# Patient Record
Sex: MALE | Race: WHITE | NOT HISPANIC OR LATINO | Employment: OTHER | URBAN - METROPOLITAN AREA
[De-identification: names, ages, dates, MRNs, and addresses within clinical notes are randomized per-mention and may not be internally consistent; named-entity substitution may affect disease eponyms.]

---

## 2023-03-27 ENCOUNTER — NURSING HOME VISIT (OUTPATIENT)
Dept: POST ACUTE CARE | Facility: EXTERNAL LOCATION | Age: 73
End: 2023-03-27
Payer: COMMERCIAL

## 2023-03-27 DIAGNOSIS — F52.8 HYPERSEXUALITY: ICD-10-CM

## 2023-03-27 DIAGNOSIS — F20.9 SCHIZOPHRENIA, UNSPECIFIED TYPE (MULTI): ICD-10-CM

## 2023-03-27 DIAGNOSIS — G20.A1 SEVERE DEMENTIA DUE TO PARKINSON'S DISEASE, WITH MOOD DISTURBANCE (MULTI): ICD-10-CM

## 2023-03-27 DIAGNOSIS — G20.A1 PARKINSON DISEASE (MULTI): ICD-10-CM

## 2023-03-27 DIAGNOSIS — I48.91 ATRIAL FIBRILLATION, UNSPECIFIED TYPE (MULTI): ICD-10-CM

## 2023-03-27 DIAGNOSIS — I15.9 SECONDARY HYPERTENSION: ICD-10-CM

## 2023-03-27 DIAGNOSIS — F02.C3 SEVERE DEMENTIA DUE TO PARKINSON'S DISEASE, WITH MOOD DISTURBANCE (MULTI): ICD-10-CM

## 2023-03-27 DIAGNOSIS — F41.8 DEPRESSION WITH ANXIETY: ICD-10-CM

## 2023-03-27 DIAGNOSIS — G47.33 OSA ON CPAP: ICD-10-CM

## 2023-03-27 PROCEDURE — 99309 SBSQ NF CARE MODERATE MDM 30: CPT | Performed by: INTERNAL MEDICINE

## 2023-03-27 NOTE — LETTER
Patient: Jian Odell  : 1950    Encounter Date: 2023    Name: Jian Odell  : 1950  MRN: [id number]  Visit Date: 3/27/2023  Chief Complaint: Monthly visit for management of chronic disease    HPI: 73 y/o W[place] male with PMH remarkable for Parkinson's disease, Dementia with behavioral disturbances, Schizophrenia, A-Fib, Sexual Dysfunction, HTN, HLD, RLS, PVD, THEA, asthma, Polyneuropathy, Depression, Anxiety and SSS whom was a LTC patient at [facility] where he was having increased sexual behaviors. Pt was brought to  d/t needing to be on an all male unit. Pt was brought to  for med mgt, psych services.    Subjective: Seen and examined today. Sitting up in wheelchair on exam. Reports no issues. Following me around the unit. Nursing reports no new concerns. I have reviewed nursing notes since my last visit and document any significant changes Reviewed orders, medications, Labs. Reviewed and updated Interval hx and meds reviewed. Reviewed chart looking at current medications, treatments, labs, x-rays etc.     ROS:  As above in subjective. Otherwise, all other systems have been reviewed and are negative for complaint.    Medications:  Medications reviewed and verified in NH chart.     Allergies:  NKA    Vital Signs: Reviewed in [facility]    Physical Exam:  General: Alert and oriented x1-2, well nourished, no acute distress.  Eye: EOMI, normal conjunctiva.  HENT: Normocephalic, clear tympanic membranes  Neck: Supple, non-tender, no carotid bruits  Lungs: Clear to auscultation, non-labored respiration.  Heart: Normal rate, regular rhythm  Abdomen: Soft, non-tender, non-distended  Musculoskeletal: Normal range of motion - some weakness  Skin: Skin is warm, dry and pink, no rashes or lesions.  Neurologic: intact senses  Psychiatric: Cooperative    Results/Data:   Lab Results   Component Value Date    WBC 7.3 2022    HGB 13.3 (L) 2022    HCT 40.9 (L) 2022      08/03/2022    CHOL 108 05/04/2022    TRIG 96 04/06/2022    HDL 42.9 05/04/2022    ALT <3 (L) 05/11/2021    AST 11 05/11/2021     08/03/2022    K 3.7 08/03/2022     08/03/2022    CREATININE 0.81 08/03/2022    BUN 21 08/03/2022    CO2 29 08/03/2022    HGBA1C 5.9 (A) 03/04/2022       Provider Impression:   Sexual Dysfunction, Dementia with Behavioral Disturbance, Anxiety, Major Depression, Schizophrenia  - continue monitoring in secure unit  - consult psych services  - continue with current psych medications, any adjustment will come from psych    CV- HTN, AFib, h/o SSS, HLD, PVD  - continue with metoprolol for rate and BP control  - continue with lasix 20mg and kcL  - encourage elevation of BLE  - wrap with ACE wraps while OOB, remove qHS and PRN    Parkinson's Disease  - continue with current medications  - tremor on exam but pt reports that is not new  - FU with Neuro as advised q6m    Deconditioning, IFM, Weakness, Dysphagia  - PT OT to eval and tx    THEA  - continue with HS Cpap  - c/w prn inhalers    Gastric Protection  - continue with pepcid     Code Status  - Full Code    ----------------  Written by Evita Saenz LPN, acting as a scribe for Dr. Johnson. This note accurately reflects the work and decisions made by Dr. Johnson.     I, Dr. Johnson, attest all medical record entries made by the scribe were under my direction and were personally dictated by me. I have reviewed the chart and agree that the record accurately reflects my performance of the history, physical exam, and assessment and plan.       Electronically Signed By: Johanny Pierre MD   5/16/23  1:18 PM

## 2023-05-04 ENCOUNTER — NURSING HOME VISIT (OUTPATIENT)
Dept: POST ACUTE CARE | Facility: EXTERNAL LOCATION | Age: 73
End: 2023-05-04
Payer: COMMERCIAL

## 2023-05-04 DIAGNOSIS — F20.9 SCHIZOPHRENIA, UNSPECIFIED TYPE (MULTI): ICD-10-CM

## 2023-05-04 DIAGNOSIS — I48.91 ATRIAL FIBRILLATION, UNSPECIFIED TYPE (MULTI): ICD-10-CM

## 2023-05-04 DIAGNOSIS — F02.C3 SEVERE DEMENTIA DUE TO PARKINSON'S DISEASE, WITH MOOD DISTURBANCE (MULTI): ICD-10-CM

## 2023-05-04 DIAGNOSIS — F41.8 DEPRESSION WITH ANXIETY: ICD-10-CM

## 2023-05-04 DIAGNOSIS — F52.8 HYPERSEXUALITY: ICD-10-CM

## 2023-05-04 DIAGNOSIS — I15.9 SECONDARY HYPERTENSION: ICD-10-CM

## 2023-05-04 DIAGNOSIS — G20.A1 PARKINSON DISEASE (MULTI): ICD-10-CM

## 2023-05-04 DIAGNOSIS — G47.33 OSA ON CPAP: ICD-10-CM

## 2023-05-04 DIAGNOSIS — G20.A1 SEVERE DEMENTIA DUE TO PARKINSON'S DISEASE, WITH MOOD DISTURBANCE (MULTI): ICD-10-CM

## 2023-05-04 PROCEDURE — 99308 SBSQ NF CARE LOW MDM 20: CPT | Performed by: INTERNAL MEDICINE

## 2023-05-04 NOTE — LETTER
Patient: Jian Odell  : 1950    Encounter Date: 2023    Name: Jian Odell  : 1950  MRN: [id number]  Visit Date: 2023  Chief Complaint: Monthly visit for management of chronic disease    HPI: 71 y/o [place] male with PMH remarkable for Parkinson's disease, Dementia with behavioral disturbances, Schizophrenia, A-Fib, Sexual Dysfunction, HTN, HLD, RLS, PVD, THEA, asthma, Polyneuropathy, Depression, Anxiety and SSS whom was a LTC patient at [facility] where he was having increased sexual behaviors. Pt was brought to  d/t needing to be on an all male unit. Pt was brought to  for med mgt, psych services.    Subjective: Seen and examined today. Sitting up in wheelchair on exam. Reports no issues other than having some loose stools after having PO olive oil, tablespoon worth. Nursing reports no new concerns. I have reviewed nursing notes since my last visit and document any significant changes Reviewed orders, medications, Labs. Reviewed and updated Interval hx and meds reviewed. Reviewed chart looking at current medications, treatments, labs, x-rays etc.     ROS:  As above in subjective. Otherwise, all other systems have been reviewed and are negative for complaint.    Medications:  Medications reviewed and verified in NH chart.     Allergies:  NKA    Vital Signs: Reviewed in [facility]    Physical Exam:  General: Alert and oriented x1-2, well nourished, no acute distress.  Eye: EOMI, normal conjunctiva.  HENT: Normocephalic, clear tympanic membranes  Neck: Supple, non-tender, no carotid bruits  Lungs: Clear to auscultation, non-labored respiration.  Heart: Normal rate, regular rhythm  Abdomen: Soft, non-tender, non-distended  Musculoskeletal: Normal range of motion - some weakness  Skin: Skin is warm, dry and pink, no rashes or lesions.  Neurologic: intact senses  Psychiatric: Cooperative    Results/Data:   Lab Results   Component Value Date    WBC 7.3 2022    HGB 13.3 (L)  08/03/2022    HCT 40.9 (L) 08/03/2022     08/03/2022    CHOL 108 05/04/2022    TRIG 96 04/06/2022    HDL 42.9 05/04/2022    ALT <3 (L) 05/11/2021    AST 11 05/11/2021     08/03/2022    K 3.7 08/03/2022     08/03/2022    CREATININE 0.81 08/03/2022    BUN 21 08/03/2022    CO2 29 08/03/2022    HGBA1C 5.9 (A) 03/04/2022       Provider Impression:   Sexual Dysfunction, Dementia with Behavioral Disturbance, Anxiety, Major Depression, Schizophrenia  - continue monitoring in secure unit  - consult psych services  - continue with current psych medications, any adjustment will come from psych    CV- HTN, AFib, h/o SSS, HLD, PVD  - continue with metoprolol for rate and BP control  - continue with lasix 20mg and kcL  - encourage elevation of BLE  - wrap with ACE wraps while OOB, remove qHS and PRN    Parkinson's Disease  - continue with current medications  - tremor on exam but pt reports that is not new  - FU with Neuro as advised q6m    Deconditioning, IFM, Weakness, Dysphagia  - PT OT to eval and tx    THEA  - continue with HS Cpap  - c/w prn inhalers    Gastric Protection  - continue with pepcid     Code Status  - Full Code    ----------------  Written by Evita Saenz LPN, acting as a scribe for Dr. Johnson. This note accurately reflects the work and decisions made by Dr. Johnson.     I, Dr. Johnson, attest all medical record entries made by the scribe were under my direction and were personally dictated by me. I have reviewed the chart and agree that the record accurately reflects my performance of the history, physical exam, and assessment and plan.     Electronically Signed By: Johanny Pierre MD   5/16/23  1:11 PM

## 2023-05-10 PROBLEM — F52.8 HYPERSEXUALITY: Status: ACTIVE | Noted: 2023-05-10

## 2023-05-10 PROBLEM — I48.91 ATRIAL FIBRILLATION (MULTI): Status: ACTIVE | Noted: 2023-05-10

## 2023-05-10 PROBLEM — I15.9 SECONDARY HYPERTENSION: Status: ACTIVE | Noted: 2023-05-10

## 2023-05-10 PROBLEM — F20.9 SCHIZOPHRENIA (MULTI): Status: ACTIVE | Noted: 2023-05-10

## 2023-05-10 PROBLEM — F02.C3: Status: ACTIVE | Noted: 2023-05-10

## 2023-05-10 PROBLEM — F41.8 DEPRESSION WITH ANXIETY: Status: ACTIVE | Noted: 2023-05-10

## 2023-05-10 PROBLEM — G20.A1: Status: ACTIVE | Noted: 2023-05-10

## 2023-05-10 PROBLEM — G47.33 OSA ON CPAP: Status: ACTIVE | Noted: 2023-05-10

## 2023-05-10 NOTE — PROGRESS NOTES
Name: Jian Odell  : 1950  MRN: [id number]  Visit Date: 3/27/2023  Chief Complaint: Monthly visit for management of chronic disease    HPI: 73 y/o [place] male with PMH remarkable for Parkinson's disease, Dementia with behavioral disturbances, Schizophrenia, A-Fib, Sexual Dysfunction, HTN, HLD, RLS, PVD, THEA, asthma, Polyneuropathy, Depression, Anxiety and SSS whom was a LTC patient at [facility] where he was having increased sexual behaviors. Pt was brought to  d/t needing to be on an all male unit. Pt was brought to  for med mgt, psych services.    Subjective: Seen and examined today. Sitting up in wheelchair on exam. Reports no issues. Following me around the unit. Nursing reports no new concerns. I have reviewed nursing notes since my last visit and document any significant changes Reviewed orders, medications, Labs. Reviewed and updated Interval hx and meds reviewed. Reviewed chart looking at current medications, treatments, labs, x-rays etc.     ROS:  As above in subjective. Otherwise, all other systems have been reviewed and are negative for complaint.    Medications:  Medications reviewed and verified in NH chart.     Allergies:  NKA    Vital Signs: Reviewed in [facility]    Physical Exam:  General: Alert and oriented x1-2, well nourished, no acute distress.  Eye: EOMI, normal conjunctiva.  HENT: Normocephalic, clear tympanic membranes  Neck: Supple, non-tender, no carotid bruits  Lungs: Clear to auscultation, non-labored respiration.  Heart: Normal rate, regular rhythm  Abdomen: Soft, non-tender, non-distended  Musculoskeletal: Normal range of motion - some weakness  Skin: Skin is warm, dry and pink, no rashes or lesions.  Neurologic: intact senses  Psychiatric: Cooperative    Results/Data:   Lab Results   Component Value Date    WBC 7.3 2022    HGB 13.3 (L) 2022    HCT 40.9 (L) 2022     2022    CHOL 108 2022    TRIG 96 2022    HDL 42.9  05/04/2022    ALT <3 (L) 05/11/2021    AST 11 05/11/2021     08/03/2022    K 3.7 08/03/2022     08/03/2022    CREATININE 0.81 08/03/2022    BUN 21 08/03/2022    CO2 29 08/03/2022    HGBA1C 5.9 (A) 03/04/2022       Provider Impression:   Sexual Dysfunction, Dementia with Behavioral Disturbance, Anxiety, Major Depression, Schizophrenia  - continue monitoring in secure unit  - consult psych services  - continue with current psych medications, any adjustment will come from psych    CV- HTN, AFib, h/o SSS, HLD, PVD  - continue with metoprolol for rate and BP control  - continue with lasix 20mg and kcL  - encourage elevation of BLE  - wrap with ACE wraps while OOB, remove qHS and PRN    Parkinson's Disease  - continue with current medications  - tremor on exam but pt reports that is not new  - FU with Neuro as advised q6m    Deconditioning, IFM, Weakness, Dysphagia  - PT OT to eval and tx    THEA  - continue with HS Cpap  - c/w prn inhalers    Gastric Protection  - continue with pepcid     Code Status  - Full Code    ----------------  Written by Evita Saenz LPN, acting as a scribe for Dr. Johnson. This note accurately reflects the work and decisions made by Dr. Johnson.     I, Dr. Johnson, attest all medical record entries made by the scribe were under my direction and were personally dictated by me. I have reviewed the chart and agree that the record accurately reflects my performance of the history, physical exam, and assessment and plan.

## 2023-05-15 NOTE — PROGRESS NOTES
Name: Jian Odell  : 1950  MRN: [id number]  Visit Date: 2023  Chief Complaint: Monthly visit for management of chronic disease    HPI: 71 y/o [place] male with PMH remarkable for Parkinson's disease, Dementia with behavioral disturbances, Schizophrenia, A-Fib, Sexual Dysfunction, HTN, HLD, RLS, PVD, THEA, asthma, Polyneuropathy, Depression, Anxiety and SSS whom was a LTC patient at [facility] where he was having increased sexual behaviors. Pt was brought to  d/t needing to be on an all male unit. Pt was brought to  for med mgt, psych services.    Subjective: Seen and examined today. Sitting up in wheelchair on exam. Reports no issues other than having some loose stools after having PO olive oil, tablespoon worth. Nursing reports no new concerns. I have reviewed nursing notes since my last visit and document any significant changes Reviewed orders, medications, Labs. Reviewed and updated Interval hx and meds reviewed. Reviewed chart looking at current medications, treatments, labs, x-rays etc.     ROS:  As above in subjective. Otherwise, all other systems have been reviewed and are negative for complaint.    Medications:  Medications reviewed and verified in NH chart.     Allergies:  NKA    Vital Signs: Reviewed in [facility]    Physical Exam:  General: Alert and oriented x1-2, well nourished, no acute distress.  Eye: EOMI, normal conjunctiva.  HENT: Normocephalic, clear tympanic membranes  Neck: Supple, non-tender, no carotid bruits  Lungs: Clear to auscultation, non-labored respiration.  Heart: Normal rate, regular rhythm  Abdomen: Soft, non-tender, non-distended  Musculoskeletal: Normal range of motion - some weakness  Skin: Skin is warm, dry and pink, no rashes or lesions.  Neurologic: intact senses  Psychiatric: Cooperative    Results/Data:   Lab Results   Component Value Date    WBC 7.3 2022    HGB 13.3 (L) 2022    HCT 40.9 (L) 2022     2022    CHOL 108  05/04/2022    TRIG 96 04/06/2022    HDL 42.9 05/04/2022    ALT <3 (L) 05/11/2021    AST 11 05/11/2021     08/03/2022    K 3.7 08/03/2022     08/03/2022    CREATININE 0.81 08/03/2022    BUN 21 08/03/2022    CO2 29 08/03/2022    HGBA1C 5.9 (A) 03/04/2022       Provider Impression:   Sexual Dysfunction, Dementia with Behavioral Disturbance, Anxiety, Major Depression, Schizophrenia  - continue monitoring in secure unit  - consult psych services  - continue with current psych medications, any adjustment will come from psych    CV- HTN, AFib, h/o SSS, HLD, PVD  - continue with metoprolol for rate and BP control  - continue with lasix 20mg and kcL  - encourage elevation of BLE  - wrap with ACE wraps while OOB, remove qHS and PRN    Parkinson's Disease  - continue with current medications  - tremor on exam but pt reports that is not new  - FU with Neuro as advised q6m    Deconditioning, IFM, Weakness, Dysphagia  - PT OT to eval and tx    THEA  - continue with HS Cpap  - c/w prn inhalers    Gastric Protection  - continue with pepcid     Code Status  - Full Code    ----------------  Written by Evita Saenz LPN, acting as a scribe for Dr. Johnson. This note accurately reflects the work and decisions made by Dr. Johnson.     I, Dr. Johnson, attest all medical record entries made by the scribe were under my direction and were personally dictated by me. I have reviewed the chart and agree that the record accurately reflects my performance of the history, physical exam, and assessment and plan.

## 2023-07-15 ENCOUNTER — NURSING HOME VISIT (OUTPATIENT)
Dept: POST ACUTE CARE | Facility: EXTERNAL LOCATION | Age: 73
End: 2023-07-15
Payer: COMMERCIAL

## 2023-07-15 DIAGNOSIS — G20.A1 SEVERE DEMENTIA DUE TO PARKINSON'S DISEASE, WITH MOOD DISTURBANCE (MULTI): ICD-10-CM

## 2023-07-15 DIAGNOSIS — F52.8 HYPERSEXUALITY: ICD-10-CM

## 2023-07-15 DIAGNOSIS — I15.9 SECONDARY HYPERTENSION: ICD-10-CM

## 2023-07-15 DIAGNOSIS — G20.A1 PARKINSON'S DISEASE, UNSPECIFIED WHETHER DYSKINESIA PRESENT, UNSPECIFIED WHETHER MANIFESTATIONS FLUCTUATE (MULTI): ICD-10-CM

## 2023-07-15 DIAGNOSIS — F41.8 DEPRESSION WITH ANXIETY: ICD-10-CM

## 2023-07-15 DIAGNOSIS — I48.91 ATRIAL FIBRILLATION, UNSPECIFIED TYPE (MULTI): ICD-10-CM

## 2023-07-15 DIAGNOSIS — G47.33 OSA ON CPAP: ICD-10-CM

## 2023-07-15 DIAGNOSIS — F02.C3 SEVERE DEMENTIA DUE TO PARKINSON'S DISEASE, WITH MOOD DISTURBANCE (MULTI): ICD-10-CM

## 2023-07-15 DIAGNOSIS — F20.9 SCHIZOPHRENIA, UNSPECIFIED TYPE (MULTI): ICD-10-CM

## 2023-07-15 PROCEDURE — 99308 SBSQ NF CARE LOW MDM 20: CPT | Performed by: INTERNAL MEDICINE

## 2023-07-15 NOTE — LETTER
Patient: Jian Odell  : 1950    Encounter Date: 07/15/2023    Name: Jian Odell  : 1950  MRN: [id number]  Visit Date: 7/15/2023  Chief Complaint: Monthly visit for management of chronic disease    HPI: 71 y/o Zoroastrian male with PMH remarkable for Parkinson's disease, Dementia with behavioral disturbances, Schizophrenia, A-Fib, Sexual Dysfunction, HTN, HLD, RLS, PVD, THEA, asthma, Polyneuropathy, Depression, Anxiety and SSS whom was a LTC patient at [facility] where he was having increased sexual behaviors. Pt was brought to  d/t needing to be on an all male unit. Pt was brought to  for med mgt, psych services.    Subjective: Seen and examined today. Sitting up in wheelchair on exam. Reports no issues. He is able to self propel his wheelchair short distances. Nursing reports no new concerns. I have reviewed nursing notes since my last visit and document any significant changes Reviewed orders, medications, Labs. Reviewed and updated Interval hx and meds reviewed. Reviewed chart looking at current medications, treatments, labs, x-rays etc.     ROS:  As above in subjective. Otherwise, all other systems have been reviewed and are negative for complaint.    Medications:  Medications reviewed and verified in NH chart.     Allergies:  NKA    Vital Signs: Reviewed in [facility]    Physical Exam:  General: Alert and oriented x1-2, well nourished, no acute distress.  Eye: EOMI, normal conjunctiva.  HENT: Normocephalic, clear tympanic membranes  Neck: Supple, non-tender, no carotid bruits  Lungs: Clear to auscultation, non-labored respiration.  Heart: Normal rate, regular rhythm  Abdomen: Soft, non-tender, non-distended  Musculoskeletal: Normal range of motion - some weakness  Skin: Skin is warm, dry and pink, no rashes or lesions.  Neurologic: intact senses  Psychiatric: Cooperative    Results/Data:   Lab Results   Component Value Date    WBC 7.3 2022    HGB 13.3 (L) 2022    HCT 40.9  (L) 08/03/2022     08/03/2022    CHOL 108 05/04/2022    TRIG 96 04/06/2022    HDL 42.9 05/04/2022    ALT <3 (L) 05/11/2021    AST 11 05/11/2021     08/03/2022    K 3.7 08/03/2022     08/03/2022    CREATININE 0.81 08/03/2022    BUN 21 08/03/2022    CO2 29 08/03/2022    HGBA1C 5.9 (A) 03/04/2022       Provider Impression:   Sexual Dysfunction, Dementia with Behavioral Disturbance, Anxiety, Major Depression, Schizophrenia  - continue monitoring in secure unit  - consult psych services  - continue with current psych medications, any adjustment will come from psych    CV- HTN, AFib, h/o SSS, HLD, PVD  - continue with metoprolol for rate and BP control  - continue with lasix 20mg and kcL  - encourage elevation of BLE  - wrap with ACE wraps while OOB, remove qHS and PRN    Parkinson's Disease  - continue with current medications  - tremor on exam but pt reports that is not new  - FU with Neuro as advised q6m    Deconditioning, IFM, Weakness, Dysphagia  - PT OT to eval and tx    THEA  - continue with HS Cpap  - c/w prn inhalers    Gastric Protection  - continue with pepcid     Code Status  - Full Code    ----------------  Written by Evita Saenz RN, acting as a scribe for Dr. Johnson. This note accurately reflects the work and decisions made by Dr. Johnson.     I, Dr. Johnson, attest all medical record entries made by the scribe were under my direction and were personally dictated by me. I have reviewed the chart and agree that the record accurately reflects my performance of the history, physical exam, and assessment and plan.     Electronically Signed By: Johanny Pierre MD   10/20/23 12:00 PM

## 2023-08-21 PROBLEM — R60.9 EDEMA: Status: ACTIVE | Noted: 2023-08-21

## 2023-08-21 RX ORDER — RISPERIDONE 1 MG/1
TABLET ORAL NIGHTLY
COMMUNITY
End: 2024-01-29 | Stop reason: WASHOUT

## 2023-08-21 RX ORDER — THIAMINE HCL 50 MG
1 TABLET ORAL DAILY
COMMUNITY
End: 2024-01-29 | Stop reason: WASHOUT

## 2023-08-21 RX ORDER — RIVASTIGMINE TARTRATE 3 MG/1
3 CAPSULE ORAL 2 TIMES DAILY
COMMUNITY

## 2023-08-21 RX ORDER — GUAIFENESIN 600 MG/1
600 TABLET, EXTENDED RELEASE ORAL DAILY
COMMUNITY
End: 2024-01-29 | Stop reason: WASHOUT

## 2023-08-21 RX ORDER — ROPINIROLE 0.25 MG/1
1 TABLET, FILM COATED ORAL DAILY
COMMUNITY
Start: 2019-07-12 | End: 2024-01-29 | Stop reason: WASHOUT

## 2023-08-21 RX ORDER — FAMOTIDINE 20 MG/1
1 TABLET, FILM COATED ORAL 2 TIMES DAILY
COMMUNITY
End: 2024-01-29 | Stop reason: WASHOUT

## 2023-08-21 RX ORDER — QUETIAPINE FUMARATE 100 MG/1
100 TABLET, FILM COATED ORAL NIGHTLY
COMMUNITY
End: 2024-01-29 | Stop reason: WASHOUT

## 2023-08-21 RX ORDER — RASAGILINE 1 MG/1
1 TABLET ORAL DAILY
COMMUNITY
Start: 2017-03-20

## 2023-08-21 RX ORDER — ALBUTEROL SULFATE 90 UG/1
AEROSOL, METERED RESPIRATORY (INHALATION)
COMMUNITY

## 2023-08-21 RX ORDER — MELATONIN 3 MG
1 CAPSULE ORAL NIGHTLY
COMMUNITY
Start: 2020-07-24

## 2023-08-21 RX ORDER — MEDROXYPROGESTERONE ACETATE 5 MG/1
5 TABLET ORAL 2 TIMES DAILY
COMMUNITY

## 2023-08-21 RX ORDER — ROPINIROLE 0.5 MG/1
1 TABLET, FILM COATED ORAL
COMMUNITY
Start: 2017-03-20 | End: 2024-01-29 | Stop reason: WASHOUT

## 2023-08-21 RX ORDER — LORATADINE 10 MG/1
1 TABLET ORAL DAILY
COMMUNITY
End: 2024-01-29 | Stop reason: WASHOUT

## 2023-08-21 RX ORDER — DONEPEZIL HYDROCHLORIDE 5 MG/1
1 TABLET, FILM COATED ORAL NIGHTLY
COMMUNITY
Start: 2019-07-12 | End: 2024-01-29 | Stop reason: WASHOUT

## 2023-08-21 RX ORDER — CHOLECALCIFEROL (VITAMIN D3) 25 MCG
25 TABLET ORAL DAILY
COMMUNITY

## 2023-08-21 RX ORDER — GLUCOSAM/CHONDRO/HERB 149/HYAL 750-100 MG
TABLET ORAL DAILY
COMMUNITY
End: 2024-01-29 | Stop reason: WASHOUT

## 2023-08-21 RX ORDER — POTASSIUM CHLORIDE 1500 MG/1
20 TABLET, EXTENDED RELEASE ORAL DAILY
COMMUNITY
End: 2024-01-29 | Stop reason: WASHOUT

## 2023-08-21 RX ORDER — POTASSIUM CHLORIDE 750 MG/1
10 TABLET, FILM COATED, EXTENDED RELEASE ORAL DAILY
COMMUNITY
End: 2024-01-29 | Stop reason: WASHOUT

## 2023-08-21 RX ORDER — ALBUTEROL SULFATE 0.83 MG/ML
SOLUTION RESPIRATORY (INHALATION)
COMMUNITY
Start: 2019-07-12 | End: 2024-01-29 | Stop reason: WASHOUT

## 2023-08-21 RX ORDER — FUROSEMIDE 20 MG/1
1 TABLET ORAL DAILY
COMMUNITY
Start: 2019-07-12

## 2023-08-21 RX ORDER — ACETAMINOPHEN 325 MG/1
2 TABLET ORAL EVERY 4 HOURS PRN
COMMUNITY
Start: 2020-01-02

## 2023-08-21 RX ORDER — POTASSIUM CHLORIDE 1.5 G/1.58G
30 POWDER, FOR SOLUTION ORAL DAILY
COMMUNITY
Start: 2019-07-12 | End: 2024-01-29 | Stop reason: WASHOUT

## 2023-08-21 RX ORDER — METOPROLOL TARTRATE 25 MG/1
1 TABLET, FILM COATED ORAL DAILY
COMMUNITY
Start: 2014-01-17 | End: 2024-01-29 | Stop reason: WASHOUT

## 2023-08-21 RX ORDER — ATORVASTATIN CALCIUM 20 MG/1
1 TABLET, FILM COATED ORAL NIGHTLY
COMMUNITY
Start: 2019-07-12 | End: 2024-01-29 | Stop reason: WASHOUT

## 2023-08-21 RX ORDER — ASPIRIN 81 MG/1
1 TABLET ORAL DAILY
COMMUNITY
Start: 2017-03-15 | End: 2024-01-29 | Stop reason: WASHOUT

## 2023-08-21 RX ORDER — FLUOXETINE HYDROCHLORIDE 20 MG/1
20 CAPSULE ORAL DAILY
COMMUNITY

## 2023-08-21 RX ORDER — CARBIDOPA AND LEVODOPA 25; 100 MG/1; MG/1
2.5 TABLET ORAL 4 TIMES DAILY
COMMUNITY
Start: 2013-08-19

## 2023-08-21 RX ORDER — DOCUSATE SODIUM 100 MG/1
1 CAPSULE, LIQUID FILLED ORAL 2 TIMES DAILY
COMMUNITY
Start: 2019-07-12

## 2023-08-21 RX ORDER — PIMAVANSERIN TARTRATE 34 MG/1
1 CAPSULE ORAL DAILY
COMMUNITY
Start: 2020-07-24

## 2023-08-21 RX ORDER — CARBIDOPA AND LEVODOPA 25; 100 MG/1; MG/1
2.5 TABLET ORAL 2 TIMES DAILY
COMMUNITY

## 2023-08-21 RX ORDER — QUETIAPINE FUMARATE 50 MG/1
50 TABLET, FILM COATED ORAL 2 TIMES DAILY
COMMUNITY

## 2023-08-21 RX ORDER — BISACODYL 10 MG/1
10 SUPPOSITORY RECTAL DAILY PRN
COMMUNITY
End: 2024-01-29 | Stop reason: WASHOUT

## 2023-08-21 RX ORDER — TRIHEXYPHENIDYL HYDROCHLORIDE 2 MG/1
1 TABLET ORAL 2 TIMES DAILY
COMMUNITY
Start: 2014-01-17 | End: 2024-01-29 | Stop reason: WASHOUT

## 2023-09-09 ENCOUNTER — NURSING HOME VISIT (OUTPATIENT)
Dept: POST ACUTE CARE | Facility: EXTERNAL LOCATION | Age: 73
End: 2023-09-09
Payer: COMMERCIAL

## 2023-09-09 DIAGNOSIS — F20.9 SCHIZOPHRENIA, UNSPECIFIED TYPE (MULTI): ICD-10-CM

## 2023-09-09 DIAGNOSIS — F02.C3 SEVERE DEMENTIA DUE TO PARKINSON'S DISEASE, WITH MOOD DISTURBANCE (MULTI): ICD-10-CM

## 2023-09-09 DIAGNOSIS — I48.91 ATRIAL FIBRILLATION, UNSPECIFIED TYPE (MULTI): ICD-10-CM

## 2023-09-09 DIAGNOSIS — I15.9 SECONDARY HYPERTENSION: ICD-10-CM

## 2023-09-09 DIAGNOSIS — F52.8 HYPERSEXUALITY: ICD-10-CM

## 2023-09-09 DIAGNOSIS — G47.33 OSA ON CPAP: ICD-10-CM

## 2023-09-09 DIAGNOSIS — F41.8 DEPRESSION WITH ANXIETY: ICD-10-CM

## 2023-09-09 DIAGNOSIS — G20.A1 SEVERE DEMENTIA DUE TO PARKINSON'S DISEASE, WITH MOOD DISTURBANCE (MULTI): ICD-10-CM

## 2023-09-09 DIAGNOSIS — G20.A1 PARKINSON'S DISEASE, UNSPECIFIED WHETHER DYSKINESIA PRESENT, UNSPECIFIED WHETHER MANIFESTATIONS FLUCTUATE (MULTI): ICD-10-CM

## 2023-09-09 PROCEDURE — 99308 SBSQ NF CARE LOW MDM 20: CPT | Performed by: INTERNAL MEDICINE

## 2023-10-02 ENCOUNTER — APPOINTMENT (OUTPATIENT)
Dept: NEUROLOGY | Facility: CLINIC | Age: 73
End: 2023-10-02
Payer: COMMERCIAL

## 2023-10-19 NOTE — PROGRESS NOTES
Name: Jian Odell  : 1950  MRN: [id number]  Visit Date: 7/15/2023  Chief Complaint: Monthly visit for management of chronic disease    HPI: 73 y/o Mu-[place] male with PMH remarkable for Parkinson's disease, Dementia with behavioral disturbances, Schizophrenia, A-Fib, Sexual Dysfunction, HTN, HLD, RLS, PVD, THEA, asthma, Polyneuropathy, Depression, Anxiety and SSS whom was a LTC patient at [facility] where he was having increased sexual behaviors. Pt was brought to  d/t needing to be on an all male unit. Pt was brought to  for med mgt, psych services.    Subjective: Seen and examined today. Sitting up in wheelchair on exam. Reports no issues. He is able to self propel his wheelchair short distances. Nursing reports no new concerns. I have reviewed nursing notes since my last visit and document any significant changes Reviewed orders, medications, Labs. Reviewed and updated Interval hx and meds reviewed. Reviewed chart looking at current medications, treatments, labs, x-rays etc.     ROS:  As above in subjective. Otherwise, all other systems have been reviewed and are negative for complaint.    Medications:  Medications reviewed and verified in NH chart.     Allergies:  NKA    Vital Signs: Reviewed in [facility]    Physical Exam:  General: Alert and oriented x1-2, well nourished, no acute distress.  Eye: EOMI, normal conjunctiva.  HENT: Normocephalic, clear tympanic membranes  Neck: Supple, non-tender, no carotid bruits  Lungs: Clear to auscultation, non-labored respiration.  Heart: Normal rate, regular rhythm  Abdomen: Soft, non-tender, non-distended  Musculoskeletal: Normal range of motion - some weakness  Skin: Skin is warm, dry and pink, no rashes or lesions.  Neurologic: intact senses  Psychiatric: Cooperative    Results/Data:   Lab Results   Component Value Date    WBC 7.3 2022    HGB 13.3 (L) 2022    HCT 40.9 (L) 2022     2022    CHOL 108 2022    TRIG 96  04/06/2022    HDL 42.9 05/04/2022    ALT <3 (L) 05/11/2021    AST 11 05/11/2021     08/03/2022    K 3.7 08/03/2022     08/03/2022    CREATININE 0.81 08/03/2022    BUN 21 08/03/2022    CO2 29 08/03/2022    HGBA1C 5.9 (A) 03/04/2022       Provider Impression:   Sexual Dysfunction, Dementia with Behavioral Disturbance, Anxiety, Major Depression, Schizophrenia  - continue monitoring in secure unit  - consult psych services  - continue with current psych medications, any adjustment will come from psych    CV- HTN, AFib, h/o SSS, HLD, PVD  - continue with metoprolol for rate and BP control  - continue with lasix 20mg and kcL  - encourage elevation of BLE  - wrap with ACE wraps while OOB, remove qHS and PRN    Parkinson's Disease  - continue with current medications  - tremor on exam but pt reports that is not new  - FU with Neuro as advised q6m    Deconditioning, IFM, Weakness, Dysphagia  - PT OT to eval and tx    THEA  - continue with HS Cpap  - c/w prn inhalers    Gastric Protection  - continue with pepcid     Code Status  - Full Code    ----------------  Written by Evita Saenz RN, acting as a scribe for Dr. Johnson. This note accurately reflects the work and decisions made by Dr. Johnson.     I, Dr. Johnson, attest all medical record entries made by the scribe were under my direction and were personally dictated by me. I have reviewed the chart and agree that the record accurately reflects my performance of the history, physical exam, and assessment and plan.

## 2023-10-20 PROBLEM — Z78.9 NURSING HOME RESIDENT: Status: ACTIVE | Noted: 2023-07-15

## 2023-11-04 ENCOUNTER — NURSING HOME VISIT (OUTPATIENT)
Dept: POST ACUTE CARE | Facility: EXTERNAL LOCATION | Age: 73
End: 2023-11-04
Payer: COMMERCIAL

## 2023-11-04 DIAGNOSIS — F02.C3 SEVERE DEMENTIA DUE TO PARKINSON'S DISEASE, WITH MOOD DISTURBANCE (MULTI): ICD-10-CM

## 2023-11-04 DIAGNOSIS — F41.8 DEPRESSION WITH ANXIETY: ICD-10-CM

## 2023-11-04 DIAGNOSIS — I48.91 ATRIAL FIBRILLATION, UNSPECIFIED TYPE (MULTI): ICD-10-CM

## 2023-11-04 DIAGNOSIS — G47.33 OSA ON CPAP: ICD-10-CM

## 2023-11-04 DIAGNOSIS — I15.9 SECONDARY HYPERTENSION: ICD-10-CM

## 2023-11-04 DIAGNOSIS — F20.9 SCHIZOPHRENIA, UNSPECIFIED TYPE (MULTI): ICD-10-CM

## 2023-11-04 DIAGNOSIS — F52.8 HYPERSEXUALITY: ICD-10-CM

## 2023-11-04 DIAGNOSIS — G20.A1 SEVERE DEMENTIA DUE TO PARKINSON'S DISEASE, WITH MOOD DISTURBANCE (MULTI): ICD-10-CM

## 2023-11-04 DIAGNOSIS — G20.A1 PARKINSON'S DISEASE, UNSPECIFIED WHETHER DYSKINESIA PRESENT, UNSPECIFIED WHETHER MANIFESTATIONS FLUCTUATE (MULTI): ICD-10-CM

## 2023-11-04 PROCEDURE — 99308 SBSQ NF CARE LOW MDM 20: CPT | Performed by: INTERNAL MEDICINE

## 2023-11-04 NOTE — LETTER
Patient: Jian Odell  : 1950    Encounter Date: 2023    Name: Jian Odell  : 1950  MRN: [id number]  Visit Date: 2023  Chief Complaint: Monthly visit for management of chronic disease    HPI: 71 y/o Sikhism male with PMH remarkable for Parkinson's disease, Dementia with behavioral disturbances, Schizophrenia, A-Fib, Sexual Dysfunction, HTN, HLD, RLS, PVD, THEA, asthma, Polyneuropathy, Depression, Anxiety and SSS whom was a LTC patient at [facility] where he was having increased sexual behaviors. Pt was brought to  d/t needing to be on an all male unit. Pt was brought to  for med mgt, psych services.    Subjective: Seen and examined today. Sitting up in wheelchair on exam. Reports no issues. He is able to self propel his wheelchair short distances. Nursing reports no new concerns. I have reviewed nursing notes since my last visit and document any significant changes Reviewed orders, medications, Labs. Reviewed and updated Interval hx and meds reviewed. Reviewed chart looking at current medications, treatments, labs, x-rays etc.     ROS:  As above in subjective. Otherwise, all other systems have been reviewed and are negative for complaint.    Medications:  Medications reviewed and verified in NH chart.     Vital Signs: Reviewed in [facility]    Physical Exam:  General: Alert and oriented x1-2, well nourished, no acute distress.  Eye: EOMI, normal conjunctiva.  HENT: Normocephalic, clear tympanic membranes  Neck: Supple, non-tender, no carotid bruits  Lungs: Clear to auscultation, non-labored respiration.  Heart: Normal rate, regular rhythm  Abdomen: Soft, non-tender, non-distended  Musculoskeletal: Normal range of motion - some weakness  Skin: Skin is warm, dry and pink, no rashes or lesions.  Neurologic: intact senses  Psychiatric: Cooperative    Results/Data:   Lab Results   Component Value Date    WBC 7.3 2022    HGB 13.3 (L) 2022    HCT 40.9 (L) 2022    PLT  285 08/03/2022    CHOL 108 05/04/2022    TRIG 96 04/06/2022    HDL 42.9 05/04/2022    ALT <3 (L) 05/11/2021    AST 11 05/11/2021     08/03/2022    K 3.7 08/03/2022     08/03/2022    CREATININE 0.81 08/03/2022    BUN 21 08/03/2022    CO2 29 08/03/2022    HGBA1C 5.9 (A) 03/04/2022     Provider Impression:   Sexual Dysfunction, Dementia with Behavioral Disturbance, Anxiety, Major Depression, Schizophrenia  - continue monitoring in secure unit  - consult psych services  - continue with current psych medications, any adjustment will come from psych    CV- HTN, AFib, h/o SSS, HLD, PVD  - continue with metoprolol for rate and BP control  - continue with lasix 20mg and kcL  - encourage elevation of BLE  - wrap with ACE wraps while OOB, remove qHS and PRN    Parkinson's Disease  - continue with current medications  - tremor on exam but pt reports that is not new  - FU with Neuro as advised q6m    Deconditioning, IFM, Weakness, Dysphagia  - PT OT to eval and tx    THEA  - continue with HS Cpap  - c/w prn inhalers    Gastric Protection  - continue with pepcid     Code Status  - Full Code    ----------------  Written by Evita Saenz RN, acting as a scribe for Dr. Johnson. This note accurately reflects the work and decisions made by Dr. Johnson.     I, Dr. Johnson, attest all medical record entries made by the scribe were under my direction and were personally dictated by me. I have reviewed the chart and agree that the record accurately reflects my performance of the history, physical exam, and assessment and plan.     Electronically Signed By: Johanny Pierre MD   1/30/24 10:49 AM

## 2023-11-20 ENCOUNTER — OFFICE VISIT (OUTPATIENT)
Dept: NEUROLOGY | Facility: CLINIC | Age: 73
End: 2023-11-20
Payer: COMMERCIAL

## 2023-11-20 VITALS
SYSTOLIC BLOOD PRESSURE: 112 MMHG | HEIGHT: 65 IN | HEART RATE: 71 BPM | BODY MASS INDEX: 40.27 KG/M2 | DIASTOLIC BLOOD PRESSURE: 60 MMHG

## 2023-11-20 DIAGNOSIS — G20.A2 PARKINSON'S DISEASE WITH FLUCTUATING MANIFESTATIONS, UNSPECIFIED WHETHER DYSKINESIA PRESENT (MULTI): Primary | ICD-10-CM

## 2023-11-20 PROCEDURE — 99215 OFFICE O/P EST HI 40 MIN: CPT | Performed by: PSYCHIATRY & NEUROLOGY

## 2023-11-20 PROCEDURE — 3078F DIAST BP <80 MM HG: CPT | Performed by: PSYCHIATRY & NEUROLOGY

## 2023-11-20 PROCEDURE — 1036F TOBACCO NON-USER: CPT | Performed by: PSYCHIATRY & NEUROLOGY

## 2023-11-20 PROCEDURE — 1159F MED LIST DOCD IN RCRD: CPT | Performed by: PSYCHIATRY & NEUROLOGY

## 2023-11-20 PROCEDURE — 3074F SYST BP LT 130 MM HG: CPT | Performed by: PSYCHIATRY & NEUROLOGY

## 2023-11-20 RX ORDER — ZINC GLUCONATE 13.3 MG
200 LOZENGE ORAL DAILY
COMMUNITY
Start: 2023-10-04

## 2023-11-20 RX ORDER — METOPROLOL SUCCINATE 25 MG/1
25 TABLET, EXTENDED RELEASE ORAL DAILY
COMMUNITY
Start: 2023-10-15

## 2023-11-20 RX ORDER — CARBIDOPA AND LEVODOPA 25; 100 MG/1; MG/1
1 TABLET, EXTENDED RELEASE ORAL NIGHTLY
COMMUNITY

## 2023-11-20 RX ORDER — CARBIDOPA AND LEVODOPA 25; 100 MG/1; MG/1
1 TABLET, EXTENDED RELEASE ORAL DAILY
COMMUNITY

## 2023-11-20 NOTE — PROGRESS NOTES
Subjective   Jian Odell is a 73 y.o.   male.  HPI  This is a 73 year old  man with Parkinson's disease, dementia, Schizophrenia, last seen in 1/23.  He is doing well, at [facility], he can walk independently for short distances.  He has chronic difficulty lifting his left arm at the shoulder.  His tremor is controlled.  He is sleeping fairly well.  His medication list was reviewed.  Patient has not having any visual hallucinations.    Objective   Neurological Exam  Alert and pleasant older man.  Normal speech.  He follows commands.  He knows the month, year.  Mild hypertonia, bradykinesia is minimal.  No tremor.  Gait: stands with one effort, able to walk across the room.  Physical Exam  I personally reviewed laboratory, radiographic, and medical studies which were pertinent for nothing.    Assessment/Plan

## 2024-01-03 NOTE — PROGRESS NOTES
Name: Jian Odell  : 1950  MRN: [id number]  Visit Date: 2023  Chief Complaint: Monthly visit for management of chronic disease    HPI: 71 y/o Zoroastrianism male with PMH remarkable for Parkinson's disease, Dementia with behavioral disturbances, Schizophrenia, A-Fib, Sexual Dysfunction, HTN, HLD, RLS, PVD, THEA, asthma, Polyneuropathy, Depression, Anxiety and SSS whom was a LTC patient at [facility] where he was having increased sexual behaviors. Pt was brought to  d/t needing to be on an all male unit. Pt was brought to  for med mgt, psych services.    Subjective: Seen and examined today. Sitting up in wheelchair on exam. Reports no issues. He is able to self propel his wheelchair short distances. Nursing reports no new concerns. I have reviewed nursing notes since my last visit and document any significant changes Reviewed orders, medications, Labs. Reviewed and updated Interval hx and meds reviewed. Reviewed chart looking at current medications, treatments, labs, x-rays etc.     ROS:  As above in subjective. Otherwise, all other systems have been reviewed and are negative for complaint.    Medications:  Medications reviewed and verified in NH chart.     Vital Signs: Reviewed in [facility]    Physical Exam:  General: Alert and oriented x1-2, well nourished, no acute distress.  Eye: EOMI, normal conjunctiva.  HENT: Normocephalic, clear tympanic membranes  Neck: Supple, non-tender, no carotid bruits  Lungs: Clear to auscultation, non-labored respiration.  Heart: Normal rate, regular rhythm  Abdomen: Soft, non-tender, non-distended  Musculoskeletal: Normal range of motion - some weakness  Skin: Skin is warm, dry and pink, no rashes or lesions.  Neurologic: intact senses  Psychiatric: Cooperative    Results/Data:   Lab Results   Component Value Date    WBC 7.3 2022    HGB 13.3 (L) 2022    HCT 40.9 (L) 2022     2022    CHOL 108 2022    TRIG 96 2022    HDL 42.9  05/04/2022    ALT <3 (L) 05/11/2021    AST 11 05/11/2021     08/03/2022    K 3.7 08/03/2022     08/03/2022    CREATININE 0.81 08/03/2022    BUN 21 08/03/2022    CO2 29 08/03/2022    HGBA1C 5.9 (A) 03/04/2022     Provider Impression:   Sexual Dysfunction, Dementia with Behavioral Disturbance, Anxiety, Major Depression, Schizophrenia  - continue monitoring in secure unit  - consult psych services  - continue with current psych medications, any adjustment will come from psych    CV- HTN, AFib, h/o SSS, HLD, PVD  - continue with metoprolol for rate and BP control  - continue with lasix 20mg and kcL  - encourage elevation of BLE  - wrap with ACE wraps while OOB, remove qHS and PRN    Parkinson's Disease  - continue with current medications  - tremor on exam but pt reports that is not new  - FU with Neuro as advised q6m    Deconditioning, IFM, Weakness, Dysphagia  - PT OT to eval and tx    THEA  - continue with HS Cpap  - c/w prn inhalers    Gastric Protection  - continue with pepcid     Code Status  - Full Code    ----------------  Written by Evita Saenz RN, acting as a scribe for Dr. Johnson. This note accurately reflects the work and decisions made by Dr. Johnson.     I, Dr. Johnson, attest all medical record entries made by the scribe were under my direction and were personally dictated by me. I have reviewed the chart and agree that the record accurately reflects my performance of the history, physical exam, and assessment and plan.

## 2024-01-13 ENCOUNTER — NURSING HOME VISIT (OUTPATIENT)
Dept: POST ACUTE CARE | Facility: EXTERNAL LOCATION | Age: 74
End: 2024-01-13
Payer: COMMERCIAL

## 2024-01-13 DIAGNOSIS — F02.C3 SEVERE DEMENTIA DUE TO PARKINSON'S DISEASE, WITH MOOD DISTURBANCE (MULTI): ICD-10-CM

## 2024-01-13 DIAGNOSIS — G47.33 OSA ON CPAP: ICD-10-CM

## 2024-01-13 DIAGNOSIS — F52.8 HYPERSEXUALITY: ICD-10-CM

## 2024-01-13 DIAGNOSIS — I48.91 ATRIAL FIBRILLATION, UNSPECIFIED TYPE (MULTI): ICD-10-CM

## 2024-01-13 DIAGNOSIS — F20.9 SCHIZOPHRENIA, UNSPECIFIED TYPE (MULTI): ICD-10-CM

## 2024-01-13 DIAGNOSIS — G20.A1 PARKINSON'S DISEASE, UNSPECIFIED WHETHER DYSKINESIA PRESENT, UNSPECIFIED WHETHER MANIFESTATIONS FLUCTUATE (MULTI): ICD-10-CM

## 2024-01-13 DIAGNOSIS — F41.8 DEPRESSION WITH ANXIETY: ICD-10-CM

## 2024-01-13 DIAGNOSIS — G20.A1 SEVERE DEMENTIA DUE TO PARKINSON'S DISEASE, WITH MOOD DISTURBANCE (MULTI): ICD-10-CM

## 2024-01-13 DIAGNOSIS — I15.9 SECONDARY HYPERTENSION: ICD-10-CM

## 2024-01-13 PROCEDURE — 99308 SBSQ NF CARE LOW MDM 20: CPT | Performed by: INTERNAL MEDICINE

## 2024-01-13 NOTE — LETTER
Patient: Jian Odell  : 1950    Encounter Date: 2024    Name: Jian Odell  : 1950  MRN: [id number]  Visit Date: 2024  Chief Complaint: Monthly visit for management of chronic disease    HPI: 74 y/o Orthodoxy male with PMH remarkable for Parkinson's disease, Dementia with behavioral disturbances, Schizophrenia, A-Fib, Sexual Dysfunction, HTN, HLD, RLS, PVD, THEA, asthma, Polyneuropathy, Depression, Anxiety and SSS whom was a LTC patient at [facility] where he was having increased sexual behaviors. Pt was brought to  d/t needing to be on an all male unit. Pt was brought to  for med mgt, psych services.    Subjective: Seen and examined today. Sitting up in wheelchair on exam. Reports no issues. He is able to self propel his wheelchair short distances. Nursing reports no new concerns. I have reviewed nursing notes since my last visit and document any significant changes Reviewed orders, medications, Labs. Reviewed and updated Interval hx and meds reviewed. Reviewed chart looking at current medications, treatments, labs, x-rays etc.     ROS:  As above in subjective. Otherwise, all other systems have been reviewed and are negative for complaint.    Medications:  Medications reviewed and verified in NH chart.     Vital Signs: Reviewed in [facility]    Physical Exam:  General: Alert and oriented x1-2, well nourished, no acute distress.  Eye: EOMI, normal conjunctiva.  HENT: Normocephalic, clear tympanic membranes  Neck: Supple, non-tender, no carotid bruits  Lungs: Clear to auscultation, non-labored respiration.  Heart: Normal rate, regular rhythm  Abdomen: Soft, non-tender, non-distended  Musculoskeletal: Normal range of motion - some weakness  Skin: Skin is warm, dry and pink, no rashes or lesions.  Neurologic: intact senses  Psychiatric: Cooperative    Results/Data:   Lab Results   Component Value Date    WBC 7.3 2022    HGB 13.3 (L) 2022    HCT 40.9 (L) 2022    PLT  285 08/03/2022    CHOL 108 05/04/2022    TRIG 96 04/06/2022    HDL 42.9 05/04/2022    ALT <3 (L) 05/11/2021    AST 11 05/11/2021     08/03/2022    K 3.7 08/03/2022     08/03/2022    CREATININE 0.81 08/03/2022    BUN 21 08/03/2022    CO2 29 08/03/2022    HGBA1C 5.9 (A) 03/04/2022     Provider Impression:   Sexual Dysfunction, Dementia with Behavioral Disturbance, Anxiety, Major Depression, Schizophrenia  - continue monitoring in secure unit  - consult psych services  - continue with current psych medications, any adjustment will come from psych    CV- HTN, AFib, h/o SSS, HLD, PVD  - continue with metoprolol for rate and BP control  - continue with lasix 20mg and kcL  - encourage elevation of BLE  - wrap with ACE wraps while OOB, remove qHS and PRN    Parkinson's Disease  - continue with current medications  - tremor on exam but pt reports that is not new  - FU with Neuro as advised q6m    Deconditioning, IFM, Weakness, Dysphagia  - PT OT to eval and tx    THEA  - continue with HS Cpap  - c/w prn inhalers    Gastric Protection  - continue with pepcid     Code Status  - Full Code    ----------------  Written by Evita Saenz RN, acting as a scribe for Dr. Johnson. This note accurately reflects the work and decisions made by Dr. Johnson.     I, Dr. Johnson, attest all medical record entries made by the scribe were under my direction and were personally dictated by me. I have reviewed the chart and agree that the record accurately reflects my performance of the history, physical exam, and assessment and plan.     Electronically Signed By: Johanny Pierre MD   6/11/24 10:21 AM

## 2024-01-29 RX ORDER — FAMOTIDINE 20 MG/1
20 TABLET, FILM COATED ORAL 2 TIMES DAILY
COMMUNITY

## 2024-01-29 NOTE — PROGRESS NOTES
Name: Jian Odell  : 1950  MRN: [id number]  Visit Date: 2023  Chief Complaint: Monthly visit for management of chronic disease    HPI: 71 y/o Bahai male with PMH remarkable for Parkinson's disease, Dementia with behavioral disturbances, Schizophrenia, A-Fib, Sexual Dysfunction, HTN, HLD, RLS, PVD, THEA, asthma, Polyneuropathy, Depression, Anxiety and SSS whom was a LTC patient at [facility] where he was having increased sexual behaviors. Pt was brought to  d/t needing to be on an all male unit. Pt was brought to  for med mgt, psych services.    Subjective: Seen and examined today. Sitting up in wheelchair on exam. Reports no issues. He is able to self propel his wheelchair short distances. Nursing reports no new concerns. I have reviewed nursing notes since my last visit and document any significant changes Reviewed orders, medications, Labs. Reviewed and updated Interval hx and meds reviewed. Reviewed chart looking at current medications, treatments, labs, x-rays etc.     ROS:  As above in subjective. Otherwise, all other systems have been reviewed and are negative for complaint.    Medications:  Medications reviewed and verified in NH chart.     Vital Signs: Reviewed in [facility]    Physical Exam:  General: Alert and oriented x1-2, well nourished, no acute distress.  Eye: EOMI, normal conjunctiva.  HENT: Normocephalic, clear tympanic membranes  Neck: Supple, non-tender, no carotid bruits  Lungs: Clear to auscultation, non-labored respiration.  Heart: Normal rate, regular rhythm  Abdomen: Soft, non-tender, non-distended  Musculoskeletal: Normal range of motion - some weakness  Skin: Skin is warm, dry and pink, no rashes or lesions.  Neurologic: intact senses  Psychiatric: Cooperative    Results/Data:   Lab Results   Component Value Date    WBC 7.3 2022    HGB 13.3 (L) 2022    HCT 40.9 (L) 2022     2022    CHOL 108 2022    TRIG 96 2022    HDL 42.9  05/04/2022    ALT <3 (L) 05/11/2021    AST 11 05/11/2021     08/03/2022    K 3.7 08/03/2022     08/03/2022    CREATININE 0.81 08/03/2022    BUN 21 08/03/2022    CO2 29 08/03/2022    HGBA1C 5.9 (A) 03/04/2022     Provider Impression:   Sexual Dysfunction, Dementia with Behavioral Disturbance, Anxiety, Major Depression, Schizophrenia  - continue monitoring in secure unit  - consult psych services  - continue with current psych medications, any adjustment will come from psych    CV- HTN, AFib, h/o SSS, HLD, PVD  - continue with metoprolol for rate and BP control  - continue with lasix 20mg and kcL  - encourage elevation of BLE  - wrap with ACE wraps while OOB, remove qHS and PRN    Parkinson's Disease  - continue with current medications  - tremor on exam but pt reports that is not new  - FU with Neuro as advised q6m    Deconditioning, IFM, Weakness, Dysphagia  - PT OT to eval and tx    THEA  - continue with HS Cpap  - c/w prn inhalers    Gastric Protection  - continue with pepcid     Code Status  - Full Code    ----------------  Written by Evita Saenz RN, acting as a scribe for Dr. Johnson. This note accurately reflects the work and decisions made by Dr. Johnson.     I, Dr. Johnson, attest all medical record entries made by the scribe were under my direction and were personally dictated by me. I have reviewed the chart and agree that the record accurately reflects my performance of the history, physical exam, and assessment and plan.

## 2024-03-09 ENCOUNTER — NURSING HOME VISIT (OUTPATIENT)
Dept: POST ACUTE CARE | Facility: EXTERNAL LOCATION | Age: 74
End: 2024-03-09
Payer: COMMERCIAL

## 2024-03-09 DIAGNOSIS — F20.9 SCHIZOPHRENIA, UNSPECIFIED TYPE (MULTI): ICD-10-CM

## 2024-03-09 DIAGNOSIS — G47.33 OSA ON CPAP: ICD-10-CM

## 2024-03-09 DIAGNOSIS — F52.8 HYPERSEXUALITY: ICD-10-CM

## 2024-03-09 DIAGNOSIS — G20.A1 PARKINSON'S DISEASE, UNSPECIFIED WHETHER DYSKINESIA PRESENT, UNSPECIFIED WHETHER MANIFESTATIONS FLUCTUATE (MULTI): ICD-10-CM

## 2024-03-09 DIAGNOSIS — I15.9 SECONDARY HYPERTENSION: ICD-10-CM

## 2024-03-09 DIAGNOSIS — F41.8 DEPRESSION WITH ANXIETY: ICD-10-CM

## 2024-03-09 DIAGNOSIS — F02.C3 SEVERE DEMENTIA DUE TO PARKINSON'S DISEASE, WITH MOOD DISTURBANCE (MULTI): ICD-10-CM

## 2024-03-09 DIAGNOSIS — G20.A1 SEVERE DEMENTIA DUE TO PARKINSON'S DISEASE, WITH MOOD DISTURBANCE (MULTI): ICD-10-CM

## 2024-03-09 DIAGNOSIS — I48.91 ATRIAL FIBRILLATION, UNSPECIFIED TYPE (MULTI): ICD-10-CM

## 2024-03-09 PROCEDURE — 99308 SBSQ NF CARE LOW MDM 20: CPT | Performed by: INTERNAL MEDICINE

## 2024-03-09 NOTE — LETTER
Patient: Jian Odell  : 1950    Encounter Date: 2024    Name: Jian Odell  : 1950  MRN: [id number]  Visit Date: 3/9/2024  Chief Complaint: Monthly visit for management of chronic disease    HPI: 72 y/o [place] male with PMH remarkable for Parkinson's disease, Dementia with behavioral disturbances, Schizophrenia, A-Fib, Sexual Dysfunction, HTN, HLD, RLS, PVD, THEA, asthma, Polyneuropathy, Depression, Anxiety and SSS whom was a LTC patient at [facility] where he was having increased sexual behaviors. Pt was brought to  d/t needing to be on an all male unit. Pt was brought to  for med mgt, psych services.    Subjective: Seen and examined today. Sitting up in wheelchair on exam. Reports no issues. He is able to self propel his wheelchair short distances. Nursing reports no new concerns. I have reviewed nursing notes since my last visit and document any significant changes Reviewed orders, medications, Labs. Reviewed and updated Interval hx and meds reviewed. Reviewed chart looking at current medications, treatments, labs, x-rays etc.     ROS:  As above in subjective. Otherwise, all other systems have been reviewed and are negative for complaint.    Medications:  Medications reviewed and verified in NH chart.     Vital Signs: Reviewed in [facility]    Physical Exam:  General: Alert and oriented x1-2, well nourished, no acute distress.  Eye: EOMI, normal conjunctiva.  HENT: Normocephalic, clear tympanic membranes  Neck: Supple, non-tender, no carotid bruits  Lungs: Clear to auscultation, non-labored respiration.  Heart: Normal rate, regular rhythm  Abdomen: Soft, non-tender, non-distended  Musculoskeletal: Normal range of motion - some weakness  Skin: Skin is warm, dry and pink, no rashes or lesions.  Neurologic: intact senses  Psychiatric: Cooperative    Results/Data:   Lab Results   Component Value Date    WBC 7.3 2022    HGB 13.3 (L) 2022    HCT 40.9 (L) 2022    PLT  285 08/03/2022    CHOL 108 05/04/2022    TRIG 96 04/06/2022    HDL 42.9 05/04/2022    ALT <3 (L) 05/11/2021    AST 11 05/11/2021     08/03/2022    K 3.7 08/03/2022     08/03/2022    CREATININE 0.81 08/03/2022    BUN 21 08/03/2022    CO2 29 08/03/2022    HGBA1C 5.9 (A) 03/04/2022     Provider Impression:   Sexual Dysfunction, Dementia with Behavioral Disturbance, Anxiety, Major Depression, Schizophrenia  - continue monitoring in secure unit  - consult psych services  - continue with current psych medications, any adjustment will come from psych    CV- HTN, AFib, h/o SSS, HLD, PVD  - continue with metoprolol for rate and BP control  - continue with lasix 20mg and kcL  - encourage elevation of BLE  - wrap with ACE wraps while OOB, remove qHS and PRN    Parkinson's Disease  - continue with current medications  - tremor on exam but pt reports that is not new  - FU with Neuro as advised q6m    Deconditioning, IFM, Weakness, Dysphagia  - PT OT to eval and tx    THEA  - continue with HS Cpap  - c/w prn inhalers    Gastric Protection  - continue with pepcid     Code Status  - Full Code    ----------------  Written by Evita Saenz RN, acting as a scribe for Dr. Johnson. This note accurately reflects the work and decisions made by Dr. Johnson.     I, Dr. Johnson, attest all medical record entries made by the scribe were under my direction and were personally dictated by me. I have reviewed the chart and agree that the record accurately reflects my performance of the history, physical exam, and assessment and plan.       Electronically Signed By: Johanny Pierre MD   6/11/24 10:21 AM

## 2024-05-02 ENCOUNTER — NURSING HOME VISIT (OUTPATIENT)
Dept: POST ACUTE CARE | Facility: EXTERNAL LOCATION | Age: 74
End: 2024-05-02
Payer: COMMERCIAL

## 2024-05-02 DIAGNOSIS — F02.C3 SEVERE DEMENTIA DUE TO PARKINSON'S DISEASE, WITH MOOD DISTURBANCE (MULTI): ICD-10-CM

## 2024-05-02 DIAGNOSIS — F20.9 SCHIZOPHRENIA, UNSPECIFIED TYPE (MULTI): ICD-10-CM

## 2024-05-02 DIAGNOSIS — G20.A1 PARKINSON'S DISEASE, UNSPECIFIED WHETHER DYSKINESIA PRESENT, UNSPECIFIED WHETHER MANIFESTATIONS FLUCTUATE (MULTI): ICD-10-CM

## 2024-05-02 DIAGNOSIS — F41.8 DEPRESSION WITH ANXIETY: ICD-10-CM

## 2024-05-02 DIAGNOSIS — I15.9 SECONDARY HYPERTENSION: ICD-10-CM

## 2024-05-02 DIAGNOSIS — G47.33 OSA ON CPAP: ICD-10-CM

## 2024-05-02 DIAGNOSIS — F52.8 HYPERSEXUALITY: ICD-10-CM

## 2024-05-02 DIAGNOSIS — G20.A1 SEVERE DEMENTIA DUE TO PARKINSON'S DISEASE, WITH MOOD DISTURBANCE (MULTI): ICD-10-CM

## 2024-05-02 DIAGNOSIS — I48.91 ATRIAL FIBRILLATION, UNSPECIFIED TYPE (MULTI): ICD-10-CM

## 2024-05-02 PROCEDURE — 99308 SBSQ NF CARE LOW MDM 20: CPT | Performed by: INTERNAL MEDICINE

## 2024-05-02 NOTE — LETTER
Patient: Jian Odell  : 1950    Encounter Date: 2024    Name: Jian Odell  : 1950  MRN: [id number]  Visit Date: 2024  Chief Complaint: Monthly visit for management of chronic disease    HPI: 72 y/o [place] male with PMH remarkable for Parkinson's disease, Dementia with behavioral disturbances, Schizophrenia, A-Fib, Sexual Dysfunction, HTN, HLD, RLS, PVD, THEA, asthma, Polyneuropathy, Depression, Anxiety and SSS whom was a LTC patient at [facility] where he was having increased sexual behaviors. Pt was brought to  d/t needing to be on an all male unit. Pt was brought to  for med mgt, psych services.    Subjective: Seen and examined today. Sitting up in wheelchair on exam in the hallway. Reports no issues. He is able to self propel his wheelchair short distances. Nursing reports no new concerns. I have reviewed nursing notes since my last visit and document any significant changes Reviewed orders, medications, Labs. Reviewed and updated Interval hx and meds reviewed. Reviewed chart looking at current medications, treatments, labs, x-rays etc.     ROS:  As above in subjective. Otherwise, all other systems have been reviewed and are negative for complaint.    Medications:  Medications reviewed and verified in NH chart.     Vital Signs: Reviewed in [facility]    Physical Exam:  General: Alert and oriented x1-2, well nourished, no acute distress.  Eye: EOMI, normal conjunctiva.  HENT: Normocephalic, clear tympanic membranes  Neck: Supple, non-tender, no carotid bruits  Lungs: Clear to auscultation, non-labored respiration.  Heart: Normal rate, regular rhythm  Abdomen: Soft, non-tender, non-distended  Musculoskeletal: Normal range of motion - some weakness  Skin: Skin is warm, dry and pink, no rashes or lesions.  Neurologic: intact senses  Psychiatric: Cooperative    Results/Data:   Lab Results   Component Value Date    WBC 7.3 2022    HGB 13.3 (L) 2022    HCT 40.9 (L)  08/03/2022     08/03/2022    CHOL 108 05/04/2022    TRIG 96 04/06/2022    HDL 42.9 05/04/2022    ALT <3 (L) 05/11/2021    AST 11 05/11/2021     08/03/2022    K 3.7 08/03/2022     08/03/2022    CREATININE 0.81 08/03/2022    BUN 21 08/03/2022    CO2 29 08/03/2022    HGBA1C 5.9 (A) 03/04/2022     Provider Impression:   Sexual Dysfunction, Dementia with Behavioral Disturbance, Anxiety, Major Depression, Schizophrenia  - continue monitoring in secure unit  - consult psych services  - continue with current psych medications, any adjustment will come from psych    CV- HTN, AFib, h/o SSS, HLD, PVD  - continue with metoprolol for rate and BP control  - continue with lasix 20mg and kcL  - encourage elevation of BLE  - wrap with ACE wraps while OOB, remove qHS and PRN    Parkinson's Disease  - continue with current medications  - tremor on exam but pt reports that is not new  - FU with Neuro as advised q6m    Deconditioning, IFM, Weakness, Dysphagia  - PT OT to eval and tx    THEA  - continue with HS Cpap  - c/w prn inhalers    Gastric Protection  - continue with pepcid     Code Status  - Full Code    ----------------  Written by Evita Saenz RN, acting as a scribe for Dr. Johnson. This note accurately reflects the work and decisions made by Dr. Johnson.     I, Dr. Johnson, attest all medical record entries made by the scribe were under my direction and were personally dictated by me. I have reviewed the chart and agree that the record accurately reflects my performance of the history, physical exam, and assessment and plan.     Electronically Signed By: Johanny Pierre MD   6/11/24 10:22 AM

## 2024-06-10 NOTE — PROGRESS NOTES
Name: Jian Odell  : 1950  MRN: [id number]  Visit Date: 2024  Chief Complaint: Monthly visit for management of chronic disease    HPI: 74 y/o Christianity male with PMH remarkable for Parkinson's disease, Dementia with behavioral disturbances, Schizophrenia, A-Fib, Sexual Dysfunction, HTN, HLD, RLS, PVD, HTEA, asthma, Polyneuropathy, Depression, Anxiety and SSS whom was a LTC patient at [facility] where he was having increased sexual behaviors. Pt was brought to  d/t needing to be on an all male unit. Pt was brought to  for med mgt, psych services.    Subjective: Seen and examined today. Sitting up in wheelchair on exam in the hallway. Reports no issues. He is able to self propel his wheelchair short distances. Nursing reports no new concerns. I have reviewed nursing notes since my last visit and document any significant changes Reviewed orders, medications, Labs. Reviewed and updated Interval hx and meds reviewed. Reviewed chart looking at current medications, treatments, labs, x-rays etc.     ROS:  As above in subjective. Otherwise, all other systems have been reviewed and are negative for complaint.    Medications:  Medications reviewed and verified in NH chart.     Vital Signs: Reviewed in [facility]    Physical Exam:  General: Alert and oriented x1-2, well nourished, no acute distress.  Eye: EOMI, normal conjunctiva.  HENT: Normocephalic, clear tympanic membranes  Neck: Supple, non-tender, no carotid bruits  Lungs: Clear to auscultation, non-labored respiration.  Heart: Normal rate, regular rhythm  Abdomen: Soft, non-tender, non-distended  Musculoskeletal: Normal range of motion - some weakness  Skin: Skin is warm, dry and pink, no rashes or lesions.  Neurologic: intact senses  Psychiatric: Cooperative    Results/Data:   Lab Results   Component Value Date    WBC 7.3 2022    HGB 13.3 (L) 2022    HCT 40.9 (L) 2022     2022    CHOL 108 2022    TRIG 96  04/06/2022    HDL 42.9 05/04/2022    ALT <3 (L) 05/11/2021    AST 11 05/11/2021     08/03/2022    K 3.7 08/03/2022     08/03/2022    CREATININE 0.81 08/03/2022    BUN 21 08/03/2022    CO2 29 08/03/2022    HGBA1C 5.9 (A) 03/04/2022     Provider Impression:   Sexual Dysfunction, Dementia with Behavioral Disturbance, Anxiety, Major Depression, Schizophrenia  - continue monitoring in secure unit  - consult psych services  - continue with current psych medications, any adjustment will come from psych    CV- HTN, AFib, h/o SSS, HLD, PVD  - continue with metoprolol for rate and BP control  - continue with lasix 20mg and kcL  - encourage elevation of BLE  - wrap with ACE wraps while OOB, remove qHS and PRN    Parkinson's Disease  - continue with current medications  - tremor on exam but pt reports that is not new  - FU with Neuro as advised q6m    Deconditioning, IFM, Weakness, Dysphagia  - PT OT to eval and tx    THEA  - continue with HS Cpap  - c/w prn inhalers    Gastric Protection  - continue with pepcid     Code Status  - Full Code    ----------------  Written by Evita Saenz RN, acting as a scribe for Dr. Johnson. This note accurately reflects the work and decisions made by Dr. Johnson.     I, Dr. Johnson, attest all medical record entries made by the scribe were under my direction and were personally dictated by me. I have reviewed the chart and agree that the record accurately reflects my performance of the history, physical exam, and assessment and plan.

## 2024-06-10 NOTE — PROGRESS NOTES
Name: Jian Odell  : 1950  MRN: [id number]  Visit Date: 3/9/2024  Chief Complaint: Monthly visit for management of chronic disease    HPI: 72 y/o Mandaen male with PMH remarkable for Parkinson's disease, Dementia with behavioral disturbances, Schizophrenia, A-Fib, Sexual Dysfunction, HTN, HLD, RLS, PVD, THEA, asthma, Polyneuropathy, Depression, Anxiety and SSS whom was a LTC patient at [facility] where he was having increased sexual behaviors. Pt was brought to  d/t needing to be on an all male unit. Pt was brought to  for med mgt, psych services.    Subjective: Seen and examined today. Sitting up in wheelchair on exam. Reports no issues. He is able to self propel his wheelchair short distances. Nursing reports no new concerns. I have reviewed nursing notes since my last visit and document any significant changes Reviewed orders, medications, Labs. Reviewed and updated Interval hx and meds reviewed. Reviewed chart looking at current medications, treatments, labs, x-rays etc.     ROS:  As above in subjective. Otherwise, all other systems have been reviewed and are negative for complaint.    Medications:  Medications reviewed and verified in NH chart.     Vital Signs: Reviewed in [facility]    Physical Exam:  General: Alert and oriented x1-2, well nourished, no acute distress.  Eye: EOMI, normal conjunctiva.  HENT: Normocephalic, clear tympanic membranes  Neck: Supple, non-tender, no carotid bruits  Lungs: Clear to auscultation, non-labored respiration.  Heart: Normal rate, regular rhythm  Abdomen: Soft, non-tender, non-distended  Musculoskeletal: Normal range of motion - some weakness  Skin: Skin is warm, dry and pink, no rashes or lesions.  Neurologic: intact senses  Psychiatric: Cooperative    Results/Data:   Lab Results   Component Value Date    WBC 7.3 2022    HGB 13.3 (L) 2022    HCT 40.9 (L) 2022     2022    CHOL 108 2022    TRIG 96 2022    HDL 42.9  05/04/2022    ALT <3 (L) 05/11/2021    AST 11 05/11/2021     08/03/2022    K 3.7 08/03/2022     08/03/2022    CREATININE 0.81 08/03/2022    BUN 21 08/03/2022    CO2 29 08/03/2022    HGBA1C 5.9 (A) 03/04/2022     Provider Impression:   Sexual Dysfunction, Dementia with Behavioral Disturbance, Anxiety, Major Depression, Schizophrenia  - continue monitoring in secure unit  - consult psych services  - continue with current psych medications, any adjustment will come from psych    CV- HTN, AFib, h/o SSS, HLD, PVD  - continue with metoprolol for rate and BP control  - continue with lasix 20mg and kcL  - encourage elevation of BLE  - wrap with ACE wraps while OOB, remove qHS and PRN    Parkinson's Disease  - continue with current medications  - tremor on exam but pt reports that is not new  - FU with Neuro as advised q6m    Deconditioning, IFM, Weakness, Dysphagia  - PT OT to eval and tx    THEA  - continue with HS Cpap  - c/w prn inhalers    Gastric Protection  - continue with pepcid     Code Status  - Full Code    ----------------  Written by Evita Saenz RN, acting as a scribe for Dr. Johnson. This note accurately reflects the work and decisions made by Dr. Johnson.     I, Dr. Johnson, attest all medical record entries made by the scribe were under my direction and were personally dictated by me. I have reviewed the chart and agree that the record accurately reflects my performance of the history, physical exam, and assessment and plan.

## 2024-06-10 NOTE — PROGRESS NOTES
Name: Jian Odell  : 1950  MRN: [id number]  Visit Date: 2024  Chief Complaint: Monthly visit for management of chronic disease    HPI: 72 y/o Gnosticist male with PMH remarkable for Parkinson's disease, Dementia with behavioral disturbances, Schizophrenia, A-Fib, Sexual Dysfunction, HTN, HLD, RLS, PVD, THEA, asthma, Polyneuropathy, Depression, Anxiety and SSS whom was a LTC patient at [facility] where he was having increased sexual behaviors. Pt was brought to  d/t needing to be on an all male unit. Pt was brought to  for med mgt, psych services.    Subjective: Seen and examined today. Sitting up in wheelchair on exam. Reports no issues. He is able to self propel his wheelchair short distances. Nursing reports no new concerns. I have reviewed nursing notes since my last visit and document any significant changes Reviewed orders, medications, Labs. Reviewed and updated Interval hx and meds reviewed. Reviewed chart looking at current medications, treatments, labs, x-rays etc.     ROS:  As above in subjective. Otherwise, all other systems have been reviewed and are negative for complaint.    Medications:  Medications reviewed and verified in NH chart.     Vital Signs: Reviewed in [facility]    Physical Exam:  General: Alert and oriented x1-2, well nourished, no acute distress.  Eye: EOMI, normal conjunctiva.  HENT: Normocephalic, clear tympanic membranes  Neck: Supple, non-tender, no carotid bruits  Lungs: Clear to auscultation, non-labored respiration.  Heart: Normal rate, regular rhythm  Abdomen: Soft, non-tender, non-distended  Musculoskeletal: Normal range of motion - some weakness  Skin: Skin is warm, dry and pink, no rashes or lesions.  Neurologic: intact senses  Psychiatric: Cooperative    Results/Data:   Lab Results   Component Value Date    WBC 7.3 2022    HGB 13.3 (L) 2022    HCT 40.9 (L) 2022     2022    CHOL 108 2022    TRIG 96 2022    HDL 42.9  05/04/2022    ALT <3 (L) 05/11/2021    AST 11 05/11/2021     08/03/2022    K 3.7 08/03/2022     08/03/2022    CREATININE 0.81 08/03/2022    BUN 21 08/03/2022    CO2 29 08/03/2022    HGBA1C 5.9 (A) 03/04/2022     Provider Impression:   Sexual Dysfunction, Dementia with Behavioral Disturbance, Anxiety, Major Depression, Schizophrenia  - continue monitoring in secure unit  - consult psych services  - continue with current psych medications, any adjustment will come from psych    CV- HTN, AFib, h/o SSS, HLD, PVD  - continue with metoprolol for rate and BP control  - continue with lasix 20mg and kcL  - encourage elevation of BLE  - wrap with ACE wraps while OOB, remove qHS and PRN    Parkinson's Disease  - continue with current medications  - tremor on exam but pt reports that is not new  - FU with Neuro as advised q6m    Deconditioning, IFM, Weakness, Dysphagia  - PT OT to eval and tx    THEA  - continue with HS Cpap  - c/w prn inhalers    Gastric Protection  - continue with pepcid     Code Status  - Full Code    ----------------  Written by Evita Saenz RN, acting as a scribe for Dr. Johnson. This note accurately reflects the work and decisions made by Dr. Johnson.     I, Dr. Johnson, attest all medical record entries made by the scribe were under my direction and were personally dictated by me. I have reviewed the chart and agree that the record accurately reflects my performance of the history, physical exam, and assessment and plan.

## 2024-07-05 ENCOUNTER — NURSING HOME VISIT (OUTPATIENT)
Dept: POST ACUTE CARE | Facility: EXTERNAL LOCATION | Age: 74
End: 2024-07-05
Payer: COMMERCIAL

## 2024-07-05 DIAGNOSIS — F20.9 SCHIZOPHRENIA, UNSPECIFIED TYPE: ICD-10-CM

## 2024-07-05 DIAGNOSIS — F02.C3 SEVERE DEMENTIA DUE TO PARKINSON'S DISEASE, WITH MOOD DISTURBANCE (MULTI): ICD-10-CM

## 2024-07-05 DIAGNOSIS — I48.91 ATRIAL FIBRILLATION, UNSPECIFIED TYPE (MULTI): ICD-10-CM

## 2024-07-05 DIAGNOSIS — F41.8 DEPRESSION WITH ANXIETY: ICD-10-CM

## 2024-07-05 DIAGNOSIS — G20.A1 PARKINSON'S DISEASE, UNSPECIFIED WHETHER DYSKINESIA PRESENT, UNSPECIFIED WHETHER MANIFESTATIONS FLUCTUATE: ICD-10-CM

## 2024-07-05 DIAGNOSIS — G47.33 OSA ON CPAP: ICD-10-CM

## 2024-07-05 DIAGNOSIS — G20.A1 SEVERE DEMENTIA DUE TO PARKINSON'S DISEASE, WITH MOOD DISTURBANCE (MULTI): ICD-10-CM

## 2024-07-05 DIAGNOSIS — Z00.00 ENCOUNTER FOR ANNUAL WELLNESS VISIT (AWV) IN MEDICARE PATIENT: ICD-10-CM

## 2024-07-05 DIAGNOSIS — I15.9 SECONDARY HYPERTENSION: ICD-10-CM

## 2024-07-05 DIAGNOSIS — F52.8 HYPERSEXUALITY: ICD-10-CM

## 2024-07-05 NOTE — LETTER
Patient: Jian Odell  : 1950    Encounter Date: 2024    Name: Jian Odell  : 1950  MRN: [id number]  Visit Date: 2024  Chief Complaint: Monthly LTC Physician Visit for management of chronic disease. Annual History and Physical. Annual Medicare Wellness Visit.    HPI: 74 y/o Chad male with PMH remarkable for Parkinson's disease, Dementia with behavioral disturbances, Schizophrenia, A-Fib, Sexual Dysfunction, HTN, HLD, RLS, PVD, THEA, asthma, Polyneuropathy, Depression, Anxiety and SSS whom was a LTC patient at [facility] where he was having increased sexual behaviors. Pt was brought to  d/t needing to be on an all male unit. Pt was brought to  for med mgt, psych services.    Subjective: Seen and examined today. Sitting up in wheelchair on exam in the hallway. Reports no issues. He is able to self propel his wheelchair short distances. Nursing reports no new concerns.     I have reviewed nursing notes since my last visit and document any significant changes Reviewed orders, medications, Labs. Reviewed and updated Interval hx and meds reviewed. Reviewed chart looking at current medications, treatments, labs, x-rays etc.     ROS:  As above in subjective. Otherwise, all other systems have been reviewed and are negative for complaint.    Current Outpatient Medications   Medication Instructions   • acetaminophen (TylenoL) 325 mg tablet 2 tablets, oral, Every 4 hours PRN   • albuterol (Ventolin HFA) 90 mcg/actuation inhaler 2 puffs, inhalation, Every 6 hours PRN   • carbidopa-levodopa (Sinemet)  mg tablet 2.5 tablets, oral, 4 times daily, At 0400, 0700, 1000, 1300   • chlorhexidine (Peridex) 0.12 % solution 15 mL, Mouth/Throat, 2 times daily   • cholecalciferol (VITAMIN D3) 2,000 Units, oral, Daily   • cimetidine (TAGAMET) 200 mg, oral, Daily   • docusate sodium (Colace) 100 mg capsule 1 capsule, oral, 2 times daily   • famotidine (PEPCID) 20 mg, oral, 2 times daily   •  FLUoxetine (PROZAC) 20 mg, Daily   • furosemide (Lasix) 20 mg tablet 1 tablet, oral, Daily   • medroxyPROGESTERone (PROVERA) 5 mg, 2 times daily   • melatonin 3 mg capsule 1 capsule, Nightly   • metoprolol succinate XL (TOPROL-XL) 25 mg, oral, Daily   • pimavanserin (Nuplazid) 34 mg capsule 1 capsule, oral, Daily   • potassium chloride CR 20 mEq ER tablet 30 mEq, oral, Daily, Do not crush or chew.   • QUEtiapine (SEROQUEL) 100 mg, oral, 2 times daily   • rasagiline (Azilect) 1 mg tablet 1 tablet, oral, Daily   • rivastigmine (EXELON) 3 mg, oral, 2 times daily      Visit Vitals  /74   Pulse 70   Temp 36.3 °C (97.4 °F)   Resp 18   Wt 112 kg (247 lb 11.2 oz)   SpO2 95%   BMI 41.22 kg/m²   Smoking Status Never   BSA 2.27 m²      Past Medical History:   Diagnosis Date   • Anxiety disorder, unspecified     Anxiety   • Other specified anxiety disorders 05/23/2014    Depression with anxiety   • Parkinson's disease (Multi) 02/02/2017    Parkinson's disease   • Personal history of other diseases of the nervous system and sense organs     History of peripheral neuropathy   • Personal history of other diseases of the nervous system and sense organs     History of peripheral neuropathy   • Personal history of other specified conditions     History of brain tumor   • Radiculopathy, lumbar region     Lumbar radiculopathy   • Radiculopathy, lumbar region     Lumbar radiculopathy   • Unspecified dementia, unspecified severity, without behavioral disturbance, psychotic disturbance, mood disturbance, and anxiety (Multi)     Dementia   • Unspecified dementia, unspecified severity, without behavioral disturbance, psychotic disturbance, mood disturbance, and anxiety (Multi)     Dementia      Past Surgical History:   Procedure Laterality Date   • OTHER SURGICAL HISTORY  07/15/2013    Arthroplasty For Hammertoe   • TOTAL HIP ARTHROPLASTY  07/15/2013    Hip Replacement      Family History   Problem Relation Name Age of Onset   •  Asthma Father     • Stroke Father        Social History     Socioeconomic History   • Marital status:      Spouse name: Not on file   • Number of children: Not on file   • Years of education: Not on file   • Highest education level: Not on file   Occupational History   • Not on file   Tobacco Use   • Smoking status: Never   • Smokeless tobacco: Never   Substance and Sexual Activity   • Alcohol use: Not Currently   • Drug use: Never   • Sexual activity: Not on file   Other Topics Concern   • Not on file   Social History Narrative   • Not on file     Social Determinants of Health     Financial Resource Strain: Not on file   Food Insecurity: Not on file   Transportation Needs: Not on file   Physical Activity: Not on file   Stress: Not on file   Social Connections: Not on file   Intimate Partner Violence: Not on file   Housing Stability: Not on file      Allergies   Allergen Reactions   • Gabapentin Swelling and Other     Edema       Immunization History   Administered Date(s) Administered   • Moderna SARS-CoV-2 Vaccination 01/04/2022, 07/15/2022   • Pfizer Purple Cap SARS-CoV-2 12/30/2020, 01/20/2021   • Tdap vaccine, age 7 year and older (BOOSTRIX, ADACEL) 02/15/2017      Physical Exam:  General: Alert and oriented x1-2, well nourished, no acute distress.  Eye: EOMI, normal conjunctiva.  HENT: Normocephalic, clear tympanic membranes  Neck: Supple, non-tender, no carotid bruits  Lungs: Clear to auscultation, non-labored respiration.  Heart: Normal rate, regular rhythm  Abdomen: Soft, non-tender, non-distended  Musculoskeletal: Normal range of motion - some weakness  Skin: Skin is warm, dry and pink, no rashes or lesions.  Neurologic: intact senses  Psychiatric: Cooperative    Results/Data:   Lab Results   Component Value Date    WBC 7.3 08/03/2022    HGB 13.3 (L) 08/03/2022    HCT 40.9 (L) 08/03/2022     08/03/2022    CHOL 108 05/04/2022    TRIG 96 04/06/2022    HDL 42.9 05/04/2022    ALT <3 (L)  05/11/2021    AST 11 05/11/2021     08/03/2022    K 3.7 08/03/2022     08/03/2022    CREATININE 0.81 08/03/2022    BUN 21 08/03/2022    CO2 29 08/03/2022    HGBA1C 5.9 (A) 03/04/2022     Provider Impression:   Sexual Dysfunction, Dementia with Behavioral Disturbance, Anxiety, Major Depression, Schizophrenia  - continue monitoring in secure all male unit  - consulted psych services  - continue with current psych medications, any adjustment will come from psych    CV- HTN, AFib, h/o SSS, HLD, PVD  - continue with metoprolol for rate and BP control  - continue with lasix 20mg and kcL  - encourage elevation of BLE  - wrap with ACE wraps while OOB, remove qHS and PRN    Parkinson's Disease  - continue with current medications  - tremor on exam   - FU with Neuro as advised q6m    Deconditioning, IFM, Weakness, Dysphagia  - PT OT ST to eval and tx  - now LTC with restorative care    THEA  - continue with HS Cpap  - c/w prn inhalers    Gastric Protection  - continue with pepcid     Code Status  - Full Code    ----------------  Written by Evita Saenz RN, acting as a scribe for Dr. Johnson. This note accurately reflects the work and decisions made by Dr. Johnson.     I, Dr. Johnson, attest all medical record entries made by the scribe were under my direction and were personally dictated by me. I have reviewed the chart and agree that the record accurately reflects my performance of the history, physical exam, and assessment and plan.       Electronically Signed By: Johanny Pierre MD   9/27/24 12:39 PM

## 2024-09-08 ENCOUNTER — NURSING HOME VISIT (OUTPATIENT)
Dept: POST ACUTE CARE | Facility: EXTERNAL LOCATION | Age: 74
End: 2024-09-08
Payer: COMMERCIAL

## 2024-09-08 DIAGNOSIS — I15.9 SECONDARY HYPERTENSION: ICD-10-CM

## 2024-09-08 DIAGNOSIS — F20.9 SCHIZOPHRENIA, UNSPECIFIED TYPE: ICD-10-CM

## 2024-09-08 DIAGNOSIS — G20.A1 PARKINSON'S DISEASE, UNSPECIFIED WHETHER DYSKINESIA PRESENT, UNSPECIFIED WHETHER MANIFESTATIONS FLUCTUATE: ICD-10-CM

## 2024-09-08 DIAGNOSIS — I48.91 ATRIAL FIBRILLATION, UNSPECIFIED TYPE (MULTI): ICD-10-CM

## 2024-09-08 DIAGNOSIS — G20.A1 SEVERE DEMENTIA DUE TO PARKINSON'S DISEASE, WITH MOOD DISTURBANCE (MULTI): ICD-10-CM

## 2024-09-08 DIAGNOSIS — F02.C3 SEVERE DEMENTIA DUE TO PARKINSON'S DISEASE, WITH MOOD DISTURBANCE (MULTI): ICD-10-CM

## 2024-09-08 DIAGNOSIS — Z00.00 ROUTINE GENERAL MEDICAL EXAMINATION AT A HEALTH CARE FACILITY: ICD-10-CM

## 2024-09-08 DIAGNOSIS — F41.8 DEPRESSION WITH ANXIETY: ICD-10-CM

## 2024-09-08 DIAGNOSIS — G47.33 OSA ON CPAP: ICD-10-CM

## 2024-09-08 DIAGNOSIS — F52.8 HYPERSEXUALITY: ICD-10-CM

## 2024-09-08 NOTE — LETTER
Patient: Jian Odell  : 1950    Encounter Date: 2024    Name: Jian Odell  : 1950  MRN: [id number]  Visit Date: 2024  Chief Complaint: Monthly LTC Physician Visit for management of chronic disease    HPI: 72 y/o Chad male with PMH remarkable for Parkinson's disease, Dementia with behavioral disturbances, Schizophrenia, A-Fib, Sexual Dysfunction, HTN, HLD, RLS, PVD, THEA, asthma, Polyneuropathy, Depression, Anxiety and SSS whom was a LTC patient at [facility] where he was having increased sexual behaviors. Pt was brought to  d/t needing to be on an all male unit. Pt was brought to  for med mgt, psych services.    Subjective: Seen and examined today. Sitting up in wheelchair on exam in the hallway. Reports no issues. He is able to self propel his wheelchair short distances. Nursing reports no new concerns.     I have reviewed nursing notes since my last visit and document any significant changes Reviewed orders, medications, Labs. Reviewed and updated Interval hx and meds reviewed. Reviewed chart looking at current medications, treatments, labs, x-rays etc.     ROS:  As above in subjective. Otherwise, all other systems have been reviewed and are negative for complaint.    Current Outpatient Medications   Medication Instructions   • acetaminophen (TylenoL) 325 mg tablet 2 tablets, oral, Every 4 hours PRN   • albuterol (Ventolin HFA) 90 mcg/actuation inhaler 2 puffs, inhalation, Every 6 hours PRN   • carbidopa-levodopa (Sinemet)  mg tablet 2.5 tablets, oral, 4 times daily, At 0400, 0700, 1000, 1300   • chlorhexidine (Peridex) 0.12 % solution 15 mL, Mouth/Throat, 2 times daily   • cholecalciferol (VITAMIN D3) 2,000 Units, oral, Daily   • cimetidine (TAGAMET) 200 mg, oral, Daily   • docusate sodium (Colace) 100 mg capsule 1 capsule, oral, 2 times daily   • famotidine (PEPCID) 20 mg, oral, 2 times daily   • FLUoxetine (PROZAC) 20 mg, Daily   • furosemide (Lasix) 20 mg  tablet 1 tablet, oral, Daily   • medroxyPROGESTERone (PROVERA) 5 mg, 2 times daily   • melatonin 3 mg capsule 1 capsule, Nightly   • metoprolol succinate XL (TOPROL-XL) 25 mg, oral, Daily   • pimavanserin (Nuplazid) 34 mg capsule 1 capsule, oral, Daily   • potassium chloride CR 20 mEq ER tablet 30 mEq, oral, Daily, Do not crush or chew.   • QUEtiapine (SEROQUEL) 100 mg, oral, 2 times daily   • rasagiline (Azilect) 1 mg tablet 1 tablet, oral, Daily   • rivastigmine (EXELON) 3 mg, oral, 2 times daily     Physical Exam:  General: Alert and oriented x1-2, well nourished, no acute distress.  Eye: EOMI, normal conjunctiva.  HENT: Normocephalic, clear tympanic membranes  Neck: Supple, non-tender, no carotid bruits  Lungs: Clear to auscultation, non-labored respiration.  Heart: Normal rate, regular rhythm  Abdomen: Soft, non-tender, non-distended  Musculoskeletal: Normal range of motion - some weakness  Skin: Skin is warm, dry and pink, no rashes or lesions.  Neurologic: intact senses  Psychiatric: Cooperative    Results/Data:   Lab Results   Component Value Date    WBC 7.3 08/03/2022    HGB 13.3 (L) 08/03/2022    HCT 40.9 (L) 08/03/2022     08/03/2022    CHOL 108 05/04/2022    TRIG 96 04/06/2022    HDL 42.9 05/04/2022    ALT <3 (L) 05/11/2021    AST 11 05/11/2021     08/03/2022    K 3.7 08/03/2022     08/03/2022    CREATININE 0.81 08/03/2022    BUN 21 08/03/2022    CO2 29 08/03/2022    HGBA1C 5.9 (A) 03/04/2022     Provider Impression:   Sexual Dysfunction, Dementia with Behavioral Disturbance, Anxiety, Major Depression, Schizophrenia  - continue monitoring in secure all male unit  - consulted psych services  - continue with current psych medications, any adjustment will come from psych    CV- HTN, AFib, h/o SSS, HLD, PVD  - continue with metoprolol for rate and BP control  - continue with lasix 20mg and kcL  - encourage elevation of BLE  - wrap with ACE wraps while OOB, remove qHS and PRN    Parkinson's  Disease  - continue with current medications  - tremor on exam   - FU with Neuro as advised q6m    Deconditioning, IFM, Weakness, Dysphagia  - PT OT ST to eval and tx  - now LTC with restorative care    THEA  - continue with HS Cpap  - c/w prn inhalers    Gastric Protection  - continue with pepcid     Code Status  - Full Code    ----------------  Written by Evita Saenz RN, acting as a scribe for Dr. Johnson. This note accurately reflects the work and decisions made by Dr. Johnson.     I, Dr. Johnson, attest all medical record entries made by the scribe were under my direction and were personally dictated by me. I have reviewed the chart and agree that the record accurately reflects my performance of the history, physical exam, and assessment and plan.       Electronically Signed By: Johanny Pierre MD   9/27/24 12:40 PM

## 2024-09-26 VITALS
WEIGHT: 247.7 LBS | DIASTOLIC BLOOD PRESSURE: 74 MMHG | OXYGEN SATURATION: 95 % | TEMPERATURE: 97.4 F | HEART RATE: 70 BPM | RESPIRATION RATE: 18 BRPM | BODY MASS INDEX: 41.22 KG/M2 | SYSTOLIC BLOOD PRESSURE: 124 MMHG

## 2024-09-26 PROBLEM — Z00.00 ENCOUNTER FOR ANNUAL WELLNESS VISIT (AWV) IN MEDICARE PATIENT: Status: ACTIVE | Noted: 2024-09-26

## 2024-09-26 RX ORDER — POTASSIUM CHLORIDE 20 MEQ/1
30 TABLET, EXTENDED RELEASE ORAL DAILY
COMMUNITY

## 2024-09-26 RX ORDER — ALBUTEROL SULFATE 90 UG/1
2 INHALANT RESPIRATORY (INHALATION) EVERY 6 HOURS PRN
COMMUNITY

## 2024-09-26 RX ORDER — CHLORHEXIDINE GLUCONATE ORAL RINSE 1.2 MG/ML
15 SOLUTION DENTAL 2 TIMES DAILY
COMMUNITY

## 2024-09-26 ASSESSMENT — ACTIVITIES OF DAILY LIVING (ADL)
MANAGING_FINANCES: TOTAL CARE
GROCERY_SHOPPING: TOTAL CARE
DOING_HOUSEWORK: TOTAL CARE
DRESSING: NEEDS ASSISTANCE
BATHING: DEPENDENT
TAKING_MEDICATION: NEEDS ASSISTANCE

## 2024-09-26 ASSESSMENT — PATIENT HEALTH QUESTIONNAIRE - PHQ9
10. IF YOU CHECKED OFF ANY PROBLEMS, HOW DIFFICULT HAVE THESE PROBLEMS MADE IT FOR YOU TO DO YOUR WORK, TAKE CARE OF THINGS AT HOME, OR GET ALONG WITH OTHER PEOPLE: SOMEWHAT DIFFICULT
SUM OF ALL RESPONSES TO PHQ9 QUESTIONS 1 AND 2: 2
2. FEELING DOWN, DEPRESSED OR HOPELESS: SEVERAL DAYS
1. LITTLE INTEREST OR PLEASURE IN DOING THINGS: SEVERAL DAYS

## 2024-09-26 ASSESSMENT — ENCOUNTER SYMPTOMS
OCCASIONAL FEELINGS OF UNSTEADINESS: 1
DEPRESSION: 1
LOSS OF SENSATION IN FEET: 0

## 2024-09-26 NOTE — PROGRESS NOTES
Name: Jian Odell  : 1950  MRN: [id number]  Visit Date: 2024  Chief Complaint: Monthly LTC Physician Visit for management of chronic disease    HPI: 74 y/o Chad male with PMH remarkable for Parkinson's disease, Dementia with behavioral disturbances, Schizophrenia, A-Fib, Sexual Dysfunction, HTN, HLD, RLS, PVD, THEA, asthma, Polyneuropathy, Depression, Anxiety and SSS whom was a LTC patient at [facility] where he was having increased sexual behaviors. Pt was brought to  d/t needing to be on an all male unit. Pt was brought to  for med mgt, psych services.    Subjective: Seen and examined today. Sitting up in wheelchair on exam in the hallway. Reports no issues. He is able to self propel his wheelchair short distances. Nursing reports no new concerns.     I have reviewed nursing notes since my last visit and document any significant changes Reviewed orders, medications, Labs. Reviewed and updated Interval hx and meds reviewed. Reviewed chart looking at current medications, treatments, labs, x-rays etc.     ROS:  As above in subjective. Otherwise, all other systems have been reviewed and are negative for complaint.    Current Outpatient Medications   Medication Instructions    acetaminophen (TylenoL) 325 mg tablet 2 tablets, oral, Every 4 hours PRN    albuterol (Ventolin HFA) 90 mcg/actuation inhaler 2 puffs, inhalation, Every 6 hours PRN    carbidopa-levodopa (Sinemet)  mg tablet 2.5 tablets, oral, 4 times daily, At 0400, 0700, 1000, 1300    chlorhexidine (Peridex) 0.12 % solution 15 mL, Mouth/Throat, 2 times daily    cholecalciferol (VITAMIN D3) 2,000 Units, oral, Daily    cimetidine (TAGAMET) 200 mg, oral, Daily    docusate sodium (Colace) 100 mg capsule 1 capsule, oral, 2 times daily    famotidine (PEPCID) 20 mg, oral, 2 times daily    FLUoxetine (PROZAC) 20 mg, Daily    furosemide (Lasix) 20 mg tablet 1 tablet, oral, Daily    medroxyPROGESTERone (PROVERA) 5 mg, 2 times daily     melatonin 3 mg capsule 1 capsule, Nightly    metoprolol succinate XL (TOPROL-XL) 25 mg, oral, Daily    pimavanserin (Nuplazid) 34 mg capsule 1 capsule, oral, Daily    potassium chloride CR 20 mEq ER tablet 30 mEq, oral, Daily, Do not crush or chew.    QUEtiapine (SEROQUEL) 100 mg, oral, 2 times daily    rasagiline (Azilect) 1 mg tablet 1 tablet, oral, Daily    rivastigmine (EXELON) 3 mg, oral, 2 times daily     Physical Exam:  General: Alert and oriented x1-2, well nourished, no acute distress.  Eye: EOMI, normal conjunctiva.  HENT: Normocephalic, clear tympanic membranes  Neck: Supple, non-tender, no carotid bruits  Lungs: Clear to auscultation, non-labored respiration.  Heart: Normal rate, regular rhythm  Abdomen: Soft, non-tender, non-distended  Musculoskeletal: Normal range of motion - some weakness  Skin: Skin is warm, dry and pink, no rashes or lesions.  Neurologic: intact senses  Psychiatric: Cooperative    Results/Data:   Lab Results   Component Value Date    WBC 7.3 08/03/2022    HGB 13.3 (L) 08/03/2022    HCT 40.9 (L) 08/03/2022     08/03/2022    CHOL 108 05/04/2022    TRIG 96 04/06/2022    HDL 42.9 05/04/2022    ALT <3 (L) 05/11/2021    AST 11 05/11/2021     08/03/2022    K 3.7 08/03/2022     08/03/2022    CREATININE 0.81 08/03/2022    BUN 21 08/03/2022    CO2 29 08/03/2022    HGBA1C 5.9 (A) 03/04/2022     Provider Impression:   Sexual Dysfunction, Dementia with Behavioral Disturbance, Anxiety, Major Depression, Schizophrenia  - continue monitoring in secure all male unit  - consulted psych services  - continue with current psych medications, any adjustment will come from psych    CV- HTN, AFib, h/o SSS, HLD, PVD  - continue with metoprolol for rate and BP control  - continue with lasix 20mg and kcL  - encourage elevation of BLE  - wrap with ACE wraps while OOB, remove qHS and PRN    Parkinson's Disease  - continue with current medications  - tremor on exam   - FU with Neuro as  advised q6m    Deconditioning, IFM, Weakness, Dysphagia  - PT OT ST to eval and tx  - now LTC with restorative care    THEA  - continue with HS Cpap  - c/w prn inhalers    Gastric Protection  - continue with pepcid     Code Status  - Full Code    ----------------  Written by Evita Saenz RN, acting as a scribe for Dr. Johnson. This note accurately reflects the work and decisions made by Dr. Johnson.     I, Dr. Johnson, attest all medical record entries made by the scribe were under my direction and were personally dictated by me. I have reviewed the chart and agree that the record accurately reflects my performance of the history, physical exam, and assessment and plan.

## 2024-09-26 NOTE — PROGRESS NOTES
Name: Jian Odell  : 1950  MRN: [id number]  Visit Date: 2024  Chief Complaint: Monthly LTC Physician Visit for management of chronic disease. Annual History and Physical. Annual Medicare Wellness Visit.    HPI: 72 y/o Mandaeism male with PMH remarkable for Parkinson's disease, Dementia with behavioral disturbances, Schizophrenia, A-Fib, Sexual Dysfunction, HTN, HLD, RLS, PVD, THEA, asthma, Polyneuropathy, Depression, Anxiety and SSS whom was a LTC patient at [facility] where he was having increased sexual behaviors. Pt was brought to  d/t needing to be on an all male unit. Pt was brought to  for med mgt, psych services.    Subjective: Seen and examined today. Sitting up in wheelchair on exam in the hallway. Reports no issues. He is able to self propel his wheelchair short distances. Nursing reports no new concerns.     I have reviewed nursing notes since my last visit and document any significant changes Reviewed orders, medications, Labs. Reviewed and updated Interval hx and meds reviewed. Reviewed chart looking at current medications, treatments, labs, x-rays etc.     ROS:  As above in subjective. Otherwise, all other systems have been reviewed and are negative for complaint.    Current Outpatient Medications   Medication Instructions    acetaminophen (TylenoL) 325 mg tablet 2 tablets, oral, Every 4 hours PRN    albuterol (Ventolin HFA) 90 mcg/actuation inhaler 2 puffs, inhalation, Every 6 hours PRN    carbidopa-levodopa (Sinemet)  mg tablet 2.5 tablets, oral, 4 times daily, At 0400, 0700, 1000, 1300    chlorhexidine (Peridex) 0.12 % solution 15 mL, Mouth/Throat, 2 times daily    cholecalciferol (VITAMIN D3) 2,000 Units, oral, Daily    cimetidine (TAGAMET) 200 mg, oral, Daily    docusate sodium (Colace) 100 mg capsule 1 capsule, oral, 2 times daily    famotidine (PEPCID) 20 mg, oral, 2 times daily    FLUoxetine (PROZAC) 20 mg, Daily    furosemide (Lasix) 20 mg tablet 1 tablet,  oral, Daily    medroxyPROGESTERone (PROVERA) 5 mg, 2 times daily    melatonin 3 mg capsule 1 capsule, Nightly    metoprolol succinate XL (TOPROL-XL) 25 mg, oral, Daily    pimavanserin (Nuplazid) 34 mg capsule 1 capsule, oral, Daily    potassium chloride CR 20 mEq ER tablet 30 mEq, oral, Daily, Do not crush or chew.    QUEtiapine (SEROQUEL) 100 mg, oral, 2 times daily    rasagiline (Azilect) 1 mg tablet 1 tablet, oral, Daily    rivastigmine (EXELON) 3 mg, oral, 2 times daily      Visit Vitals  /74   Pulse 70   Temp 36.3 °C (97.4 °F)   Resp 18   Wt 112 kg (247 lb 11.2 oz)   SpO2 95%   BMI 41.22 kg/m²   Smoking Status Never   BSA 2.27 m²      Past Medical History:   Diagnosis Date    Anxiety disorder, unspecified     Anxiety    Other specified anxiety disorders 05/23/2014    Depression with anxiety    Parkinson's disease (Multi) 02/02/2017    Parkinson's disease    Personal history of other diseases of the nervous system and sense organs     History of peripheral neuropathy    Personal history of other diseases of the nervous system and sense organs     History of peripheral neuropathy    Personal history of other specified conditions     History of brain tumor    Radiculopathy, lumbar region     Lumbar radiculopathy    Radiculopathy, lumbar region     Lumbar radiculopathy    Unspecified dementia, unspecified severity, without behavioral disturbance, psychotic disturbance, mood disturbance, and anxiety (Multi)     Dementia    Unspecified dementia, unspecified severity, without behavioral disturbance, psychotic disturbance, mood disturbance, and anxiety (Multi)     Dementia      Past Surgical History:   Procedure Laterality Date    OTHER SURGICAL HISTORY  07/15/2013    Arthroplasty For Hammertoe    TOTAL HIP ARTHROPLASTY  07/15/2013    Hip Replacement      Family History   Problem Relation Name Age of Onset    Asthma Father      Stroke Father        Social History     Socioeconomic History    Marital status:       Spouse name: Not on file    Number of children: Not on file    Years of education: Not on file    Highest education level: Not on file   Occupational History    Not on file   Tobacco Use    Smoking status: Never    Smokeless tobacco: Never   Substance and Sexual Activity    Alcohol use: Not Currently    Drug use: Never    Sexual activity: Not on file   Other Topics Concern    Not on file   Social History Narrative    Not on file     Social Determinants of Health     Financial Resource Strain: Not on file   Food Insecurity: Not on file   Transportation Needs: Not on file   Physical Activity: Not on file   Stress: Not on file   Social Connections: Not on file   Intimate Partner Violence: Not on file   Housing Stability: Not on file      Allergies   Allergen Reactions    Gabapentin Swelling and Other     Edema       Immunization History   Administered Date(s) Administered    Moderna SARS-CoV-2 Vaccination 01/04/2022, 07/15/2022    Pfizer Purple Cap SARS-CoV-2 12/30/2020, 01/20/2021    Tdap vaccine, age 7 year and older (BOOSTRIX, ADACEL) 02/15/2017      Physical Exam:  General: Alert and oriented x1-2, well nourished, no acute distress.  Eye: EOMI, normal conjunctiva.  HENT: Normocephalic, clear tympanic membranes  Neck: Supple, non-tender, no carotid bruits  Lungs: Clear to auscultation, non-labored respiration.  Heart: Normal rate, regular rhythm  Abdomen: Soft, non-tender, non-distended  Musculoskeletal: Normal range of motion - some weakness  Skin: Skin is warm, dry and pink, no rashes or lesions.  Neurologic: intact senses  Psychiatric: Cooperative    Results/Data:   Lab Results   Component Value Date    WBC 7.3 08/03/2022    HGB 13.3 (L) 08/03/2022    HCT 40.9 (L) 08/03/2022     08/03/2022    CHOL 108 05/04/2022    TRIG 96 04/06/2022    HDL 42.9 05/04/2022    ALT <3 (L) 05/11/2021    AST 11 05/11/2021     08/03/2022    K 3.7 08/03/2022     08/03/2022    CREATININE 0.81 08/03/2022     BUN 21 08/03/2022    CO2 29 08/03/2022    HGBA1C 5.9 (A) 03/04/2022     Provider Impression:   Sexual Dysfunction, Dementia with Behavioral Disturbance, Anxiety, Major Depression, Schizophrenia  - continue monitoring in secure all male unit  - consulted psych services  - continue with current psych medications, any adjustment will come from psych    CV- HTN, AFib, h/o SSS, HLD, PVD  - continue with metoprolol for rate and BP control  - continue with lasix 20mg and kcL  - encourage elevation of BLE  - wrap with ACE wraps while OOB, remove qHS and PRN    Parkinson's Disease  - continue with current medications  - tremor on exam   - FU with Neuro as advised q6m    Deconditioning, IFM, Weakness, Dysphagia  - PT OT ST to eval and tx  - now LTC with restorative care    THEA  - continue with HS Cpap  - c/w prn inhalers    Gastric Protection  - continue with pepcid     Code Status  - Full Code    ----------------  Written by Evita Saenz RN, acting as a scribe for Dr. Johnson. This note accurately reflects the work and decisions made by Dr. Johnson.     I, Dr. Johnson, attest all medical record entries made by the scribe were under my direction and were personally dictated by me. I have reviewed the chart and agree that the record accurately reflects my performance of the history, physical exam, and assessment and plan.

## 2024-11-09 ENCOUNTER — NURSING HOME VISIT (OUTPATIENT)
Dept: POST ACUTE CARE | Facility: EXTERNAL LOCATION | Age: 74
End: 2024-11-09
Payer: COMMERCIAL

## 2024-11-09 DIAGNOSIS — I15.9 SECONDARY HYPERTENSION: ICD-10-CM

## 2024-11-09 DIAGNOSIS — Z78.9 NURSING HOME RESIDENT: ICD-10-CM

## 2024-11-09 DIAGNOSIS — G20.A1 SEVERE DEMENTIA DUE TO PARKINSON'S DISEASE, WITH MOOD DISTURBANCE (MULTI): ICD-10-CM

## 2024-11-09 DIAGNOSIS — F52.8 HYPERSEXUALITY: ICD-10-CM

## 2024-11-09 DIAGNOSIS — G47.33 OSA ON CPAP: ICD-10-CM

## 2024-11-09 DIAGNOSIS — F02.C3 SEVERE DEMENTIA DUE TO PARKINSON'S DISEASE, WITH MOOD DISTURBANCE (MULTI): ICD-10-CM

## 2024-11-09 DIAGNOSIS — Z00.00 ROUTINE GENERAL MEDICAL EXAMINATION AT A HEALTH CARE FACILITY: ICD-10-CM

## 2024-11-09 DIAGNOSIS — I48.91 ATRIAL FIBRILLATION, UNSPECIFIED TYPE (MULTI): ICD-10-CM

## 2024-11-09 DIAGNOSIS — G20.A1 PARKINSON'S DISEASE, UNSPECIFIED WHETHER DYSKINESIA PRESENT, UNSPECIFIED WHETHER MANIFESTATIONS FLUCTUATE: ICD-10-CM

## 2024-11-09 DIAGNOSIS — F20.9 SCHIZOPHRENIA, UNSPECIFIED TYPE: ICD-10-CM

## 2024-11-09 DIAGNOSIS — F41.8 DEPRESSION WITH ANXIETY: ICD-10-CM

## 2024-11-09 PROCEDURE — 99308 SBSQ NF CARE LOW MDM 20: CPT | Performed by: INTERNAL MEDICINE

## 2024-11-09 NOTE — LETTER
Patient: Jian Odell  : 1950    Encounter Date: 2024    Name: Jian Odell  : 1950  MRN: [id number]  Visit Date: 2024  Chief Complaint: Monthly LTC Physician Visit for management of chronic disease    HPI: 72 y/o Alevism male with PMH remarkable for Parkinson's disease, Dementia with behavioral disturbances, Schizophrenia, A-Fib, Sexual Dysfunction, HTN, HLD, RLS, PVD, THEA, asthma, Polyneuropathy, Depression, Anxiety and SSS whom was a LTC patient at [facility] where he was having increased sexual behaviors. Pt was brought to  d/t needing to be on an all male unit. Pt was brought to  for med mgt, psych services.    Subjective: Seen and examined today. Sitting up in wheelchair on exam in the hallway. Reports no issues. He is able to self propel his wheelchair short distances. Nursing reports no new concerns.     I have reviewed nursing notes since my last visit and document any significant changes Reviewed orders, medications, Labs. Reviewed and updated Interval hx and meds reviewed. Reviewed chart looking at current medications, treatments, labs, x-rays etc.     ROS:  As above in subjective. Otherwise, all other systems have been reviewed and are negative for complaint.    Current Outpatient Medications   Medication Instructions   • acetaminophen (TylenoL) 325 mg tablet 2 tablets, oral, Every 4 hours PRN   • albuterol (Ventolin HFA) 90 mcg/actuation inhaler 2 puffs, inhalation, Every 6 hours PRN   • carbidopa-levodopa (Sinemet)  mg tablet 2.5 tablets, oral, 4 times daily, At 0400, 0700, 1000, 1300   • chlorhexidine (Peridex) 0.12 % solution 15 mL, Mouth/Throat, 2 times daily   • cholecalciferol (VITAMIN D3) 2,000 Units, oral, Daily   • cimetidine (TAGAMET) 200 mg, oral, Daily   • docusate sodium (Colace) 100 mg capsule 1 capsule, oral, 2 times daily   • famotidine (PEPCID) 20 mg, oral, 2 times daily   • FLUoxetine (PROZAC) 20 mg, Daily   • furosemide (Lasix) 20 mg  tablet 1 tablet, oral, Daily   • medroxyPROGESTERone (PROVERA) 5 mg, 2 times daily   • melatonin 3 mg capsule 1 capsule, Nightly   • metoprolol succinate XL (TOPROL-XL) 25 mg, oral, Daily   • pimavanserin (Nuplazid) 34 mg capsule 1 capsule, oral, Daily   • potassium chloride CR 20 mEq ER tablet 30 mEq, oral, Daily, Do not crush or chew.   • QUEtiapine (SEROQUEL) 100 mg, oral, 2 times daily   • rasagiline (Azilect) 1 mg tablet 1 tablet, oral, Daily   • rivastigmine (EXELON) 3 mg, oral, 2 times daily     Physical Exam:  General: Alert and oriented x1-2, well nourished, no acute distress.  Eye: EOMI, normal conjunctiva.  HENT: Normocephalic, clear tympanic membranes  Neck: Supple, non-tender, no carotid bruits  Lungs: Clear to auscultation, non-labored respiration.  Heart: Normal rate, regular rhythm  Abdomen: Soft, non-tender, non-distended  Musculoskeletal: Normal range of motion - some weakness  Skin: Skin is warm, dry and pink, no rashes or lesions.  Neurologic: intact senses  Psychiatric: Cooperative    Results/Data:   Lab Results   Component Value Date    WBC 7.3 08/03/2022    HGB 13.3 (L) 08/03/2022    HCT 40.9 (L) 08/03/2022     08/03/2022    CHOL 108 05/04/2022    TRIG 96 04/06/2022    HDL 42.9 05/04/2022    ALT <3 (L) 05/11/2021    AST 11 05/11/2021     08/03/2022    K 3.7 08/03/2022     08/03/2022    CREATININE 0.81 08/03/2022    BUN 21 08/03/2022    CO2 29 08/03/2022    HGBA1C 5.9 (A) 03/04/2022     Provider Impression:   Sexual Dysfunction, Dementia with Behavioral Disturbance, Anxiety, Major Depression, Schizophrenia  - continue monitoring in secure all male unit  - consulted psych services  - continue with current psych medications, any adjustment will come from psych    CV- HTN, AFib, h/o SSS, HLD, PVD  - continue with metoprolol for rate and BP control  - continue with lasix 20mg and kcL  - encourage elevation of BLE  - wrap with ACE wraps while OOB, remove qHS and PRN    Parkinson's  Disease  - continue with current medications  - tremor on exam   - FU with Neuro as advised q6m    Deconditioning, IFM, Weakness, Dysphagia  - PT OT ST to eval and tx  - now LTC with restorative care    THEA  - continue with HS Cpap  - c/w prn inhalers    Gastric Protection  - continue with pepcid     Code Status  - Full Code    ----------------  Written by Evita Saenz RN, acting as a scribe for Dr. Johnson. This note accurately reflects the work and decisions made by Dr. Johnson.     I, Dr. Johnson, attest all medical record entries made by the scribe were under my direction and were personally dictated by me. I have reviewed the chart and agree that the record accurately reflects my performance of the history, physical exam, and assessment and plan.       Electronically Signed By: Johanny Pierre MD   12/18/24  2:15 PM

## 2024-12-18 NOTE — PROGRESS NOTES
Name: Jian Odell  : 1950  MRN: [id number]  Visit Date: 2024  Chief Complaint: Monthly LTC Physician Visit for management of chronic disease    HPI: 72 y/o Chad male with PMH remarkable for Parkinson's disease, Dementia with behavioral disturbances, Schizophrenia, A-Fib, Sexual Dysfunction, HTN, HLD, RLS, PVD, THEA, asthma, Polyneuropathy, Depression, Anxiety and SSS whom was a LTC patient at [facility] where he was having increased sexual behaviors. Pt was brought to  d/t needing to be on an all male unit. Pt was brought to  for med mgt, psych services.    Subjective: Seen and examined today. Sitting up in wheelchair on exam in the hallway. Reports no issues. He is able to self propel his wheelchair short distances. Nursing reports no new concerns.     I have reviewed nursing notes since my last visit and document any significant changes Reviewed orders, medications, Labs. Reviewed and updated Interval hx and meds reviewed. Reviewed chart looking at current medications, treatments, labs, x-rays etc.     ROS:  As above in subjective. Otherwise, all other systems have been reviewed and are negative for complaint.    Current Outpatient Medications   Medication Instructions    acetaminophen (TylenoL) 325 mg tablet 2 tablets, oral, Every 4 hours PRN    albuterol (Ventolin HFA) 90 mcg/actuation inhaler 2 puffs, inhalation, Every 6 hours PRN    carbidopa-levodopa (Sinemet)  mg tablet 2.5 tablets, oral, 4 times daily, At 0400, 0700, 1000, 1300    chlorhexidine (Peridex) 0.12 % solution 15 mL, Mouth/Throat, 2 times daily    cholecalciferol (VITAMIN D3) 2,000 Units, oral, Daily    cimetidine (TAGAMET) 200 mg, oral, Daily    docusate sodium (Colace) 100 mg capsule 1 capsule, oral, 2 times daily    famotidine (PEPCID) 20 mg, oral, 2 times daily    FLUoxetine (PROZAC) 20 mg, Daily    furosemide (Lasix) 20 mg tablet 1 tablet, oral, Daily    medroxyPROGESTERone (PROVERA) 5 mg, 2 times daily     melatonin 3 mg capsule 1 capsule, Nightly    metoprolol succinate XL (TOPROL-XL) 25 mg, oral, Daily    pimavanserin (Nuplazid) 34 mg capsule 1 capsule, oral, Daily    potassium chloride CR 20 mEq ER tablet 30 mEq, oral, Daily, Do not crush or chew.    QUEtiapine (SEROQUEL) 100 mg, oral, 2 times daily    rasagiline (Azilect) 1 mg tablet 1 tablet, oral, Daily    rivastigmine (EXELON) 3 mg, oral, 2 times daily     Physical Exam:  General: Alert and oriented x1-2, well nourished, no acute distress.  Eye: EOMI, normal conjunctiva.  HENT: Normocephalic, clear tympanic membranes  Neck: Supple, non-tender, no carotid bruits  Lungs: Clear to auscultation, non-labored respiration.  Heart: Normal rate, regular rhythm  Abdomen: Soft, non-tender, non-distended  Musculoskeletal: Normal range of motion - some weakness  Skin: Skin is warm, dry and pink, no rashes or lesions.  Neurologic: intact senses  Psychiatric: Cooperative    Results/Data:   Lab Results   Component Value Date    WBC 7.3 08/03/2022    HGB 13.3 (L) 08/03/2022    HCT 40.9 (L) 08/03/2022     08/03/2022    CHOL 108 05/04/2022    TRIG 96 04/06/2022    HDL 42.9 05/04/2022    ALT <3 (L) 05/11/2021    AST 11 05/11/2021     08/03/2022    K 3.7 08/03/2022     08/03/2022    CREATININE 0.81 08/03/2022    BUN 21 08/03/2022    CO2 29 08/03/2022    HGBA1C 5.9 (A) 03/04/2022     Provider Impression:   Sexual Dysfunction, Dementia with Behavioral Disturbance, Anxiety, Major Depression, Schizophrenia  - continue monitoring in secure all male unit  - consulted psych services  - continue with current psych medications, any adjustment will come from psych    CV- HTN, AFib, h/o SSS, HLD, PVD  - continue with metoprolol for rate and BP control  - continue with lasix 20mg and kcL  - encourage elevation of BLE  - wrap with ACE wraps while OOB, remove qHS and PRN    Parkinson's Disease  - continue with current medications  - tremor on exam   - FU with Neuro as  advised q6m    Deconditioning, IFM, Weakness, Dysphagia  - PT OT ST to eval and tx  - now LTC with restorative care    THEA  - continue with HS Cpap  - c/w prn inhalers    Gastric Protection  - continue with pepcid     Code Status  - Full Code    ----------------  Written by Evita Saenz RN, acting as a scribe for Dr. Johnson. This note accurately reflects the work and decisions made by Dr. Johnson.     I, Dr. Johnson, attest all medical record entries made by the scribe were under my direction and were personally dictated by me. I have reviewed the chart and agree that the record accurately reflects my performance of the history, physical exam, and assessment and plan.

## 2025-01-23 ENCOUNTER — NURSING HOME VISIT (OUTPATIENT)
Dept: POST ACUTE CARE | Facility: EXTERNAL LOCATION | Age: 75
End: 2025-01-23
Payer: COMMERCIAL

## 2025-01-23 DIAGNOSIS — G20.A1 SEVERE DEMENTIA DUE TO PARKINSON'S DISEASE, WITH MOOD DISTURBANCE (MULTI): ICD-10-CM

## 2025-01-23 DIAGNOSIS — F41.8 DEPRESSION WITH ANXIETY: ICD-10-CM

## 2025-01-23 DIAGNOSIS — F02.C3 SEVERE DEMENTIA DUE TO PARKINSON'S DISEASE, WITH MOOD DISTURBANCE (MULTI): ICD-10-CM

## 2025-01-23 DIAGNOSIS — G20.A1 PARKINSON'S DISEASE, UNSPECIFIED WHETHER DYSKINESIA PRESENT, UNSPECIFIED WHETHER MANIFESTATIONS FLUCTUATE: ICD-10-CM

## 2025-01-23 DIAGNOSIS — F52.8 HYPERSEXUALITY: ICD-10-CM

## 2025-01-23 DIAGNOSIS — F20.9 SCHIZOPHRENIA, UNSPECIFIED TYPE: ICD-10-CM

## 2025-01-23 DIAGNOSIS — Z78.9 NURSING HOME RESIDENT: ICD-10-CM

## 2025-01-23 DIAGNOSIS — I48.91 ATRIAL FIBRILLATION, UNSPECIFIED TYPE (MULTI): ICD-10-CM

## 2025-01-23 DIAGNOSIS — G47.33 OSA ON CPAP: ICD-10-CM

## 2025-01-23 DIAGNOSIS — I15.9 SECONDARY HYPERTENSION: ICD-10-CM

## 2025-01-23 DIAGNOSIS — Z00.00 ROUTINE GENERAL MEDICAL EXAMINATION AT A HEALTH CARE FACILITY: ICD-10-CM

## 2025-01-23 PROCEDURE — 99308 SBSQ NF CARE LOW MDM 20: CPT | Performed by: INTERNAL MEDICINE

## 2025-01-23 NOTE — LETTER
Patient: Jian Odell  : 1950    Encounter Date: 2025    Name: Jian Odell  : 1950  MRN: [id number]  Visit Date: 2025  Chief Complaint: Monthly LTC Physician Visit for management of chronic disease    HPI: 75 y/o Mormon male with PMH remarkable for Parkinson's disease, Dementia with behavioral disturbances, Schizophrenia, A-Fib, Sexual Dysfunction, HTN, HLD, RLS, PVD, THEA, asthma, Polyneuropathy, Depression, Anxiety and SSS whom was a LTC patient at [facility] where he was having increased sexual behaviors. Pt was brought to  d/t needing to be on an all male unit. Pt was brought to  for med mgt, psych services.    Subjective: Seen and examined today. Sitting up in wheelchair on exam in the hallway. Reports no issues. He is able to self propel his wheelchair short distances. Nursing reports no new concerns.     I have reviewed nursing notes since my last visit and document any significant changes Reviewed orders, medications, Labs. Reviewed and updated Interval hx and meds reviewed. Reviewed chart looking at current medications, treatments, labs, x-rays etc.     ROS:  As above in subjective. Otherwise, all other systems have been reviewed and are negative for complaint.    Current Outpatient Medications   Medication Instructions   • acetaminophen (TylenoL) 325 mg tablet 2 tablets, oral, Every 4 hours PRN   • albuterol (Ventolin HFA) 90 mcg/actuation inhaler 2 puffs, inhalation, Every 6 hours PRN   • carbidopa-levodopa (Sinemet)  mg tablet 2.5 tablets, oral, 4 times daily, At 0400, 0700, 1000, 1300   • chlorhexidine (Peridex) 0.12 % solution 15 mL, Mouth/Throat, 2 times daily   • cholecalciferol (VITAMIN D3) 2,000 Units, oral, Daily   • cimetidine (TAGAMET) 200 mg, oral, Daily   • docusate sodium (Colace) 100 mg capsule 1 capsule, oral, 2 times daily   • famotidine (PEPCID) 20 mg, oral, 2 times daily   • FLUoxetine (PROZAC) 20 mg, Daily   • furosemide (Lasix) 20 mg  tablet 1 tablet, oral, Daily   • medroxyPROGESTERone (PROVERA) 5 mg, 2 times daily   • melatonin 3 mg capsule 1 capsule, Nightly   • metoprolol succinate XL (TOPROL-XL) 25 mg, oral, Daily   • pimavanserin (Nuplazid) 34 mg capsule 1 capsule, oral, Daily   • potassium chloride CR 20 mEq ER tablet 30 mEq, oral, Daily, Do not crush or chew.   • QUEtiapine (SEROQUEL) 100 mg, oral, 2 times daily   • rasagiline (Azilect) 1 mg tablet 1 tablet, oral, Daily   • rivastigmine (EXELON) 3 mg, oral, 2 times daily     Physical Exam:  General: Alert and oriented x1-2, well nourished, no acute distress.  Eye: EOMI, normal conjunctiva.  HENT: Normocephalic, clear tympanic membranes  Neck: Supple, non-tender, no carotid bruits  Lungs: Clear to auscultation, non-labored respiration.  Heart: Normal rate, regular rhythm  Abdomen: Soft, non-tender, non-distended  Musculoskeletal: Normal range of motion - some weakness  Skin: Skin is warm, dry and pink, no rashes or lesions.  Neurologic: intact senses  Psychiatric: Cooperative    Results/Data:   Lab Results   Component Value Date    WBC 7.3 08/03/2022    HGB 13.3 (L) 08/03/2022    HCT 40.9 (L) 08/03/2022     08/03/2022    CHOL 108 05/04/2022    TRIG 96 04/06/2022    HDL 42.9 05/04/2022    ALT <3 (L) 05/11/2021    AST 11 05/11/2021     08/03/2022    K 3.7 08/03/2022     08/03/2022    CREATININE 0.81 08/03/2022    BUN 21 08/03/2022    CO2 29 08/03/2022    HGBA1C 5.9 (A) 03/04/2022     Provider Impression:   Sexual Dysfunction, Dementia with Behavioral Disturbance, Anxiety, Major Depression, Schizophrenia  - continue monitoring in secure all male unit  - consulted psych services  - continue with current psych medications, any adjustment will come from psych    CV- HTN, AFib, h/o SSS, HLD, PVD  - continue with metoprolol for rate and BP control  - continue with lasix 20mg and kcL  - encourage elevation of BLE  - wrap with ACE wraps while OOB, remove qHS and PRN    Parkinson's  Disease  - continue with current medications  - tremor on exam   - FU with Neuro as advised q6m    Deconditioning, IFM, Weakness, Dysphagia  - PT OT ST to eval and tx  - now LTC with restorative care    THEA  - continue with HS Cpap  - c/w prn inhalers    Gastric Protection  - continue with pepcid     Code Status  - Full Code    ----------------  Written by Evita Saenz RN, acting as a scribe for Dr. Johnson. This note accurately reflects the work and decisions made by Dr. Johnson.     I, Dr. Johnson, attest all medical record entries made by the scribe were under my direction and were personally dictated by me. I have reviewed the chart and agree that the record accurately reflects my performance of the history, physical exam, and assessment and plan.       Electronically Signed By: Johanny Pierre MD   3/10/25  1:53 PM

## 2025-03-10 NOTE — PROGRESS NOTES
Name: Jian Odell  : 1950  MRN: [id number]  Visit Date: 2025  Chief Complaint: Monthly LTC Physician Visit for management of chronic disease    HPI: 73 y/o [place] male with PMH remarkable for Parkinson's disease, Dementia with behavioral disturbances, Schizophrenia, A-Fib, Sexual Dysfunction, HTN, HLD, RLS, PVD, THEA, asthma, Polyneuropathy, Depression, Anxiety and SSS whom was a LTC patient at [facility] where he was having increased sexual behaviors. Pt was brought to  d/t needing to be on an all male unit. Pt was brought to  for med mgt, psych services.    Subjective: Seen and examined today. Sitting up in wheelchair on exam in the hallway. Reports no issues. He is able to self propel his wheelchair short distances. Nursing reports no new concerns.     I have reviewed nursing notes since my last visit and document any significant changes Reviewed orders, medications, Labs. Reviewed and updated Interval hx and meds reviewed. Reviewed chart looking at current medications, treatments, labs, x-rays etc.     ROS:  As above in subjective. Otherwise, all other systems have been reviewed and are negative for complaint.    Current Outpatient Medications   Medication Instructions    acetaminophen (TylenoL) 325 mg tablet 2 tablets, oral, Every 4 hours PRN    albuterol (Ventolin HFA) 90 mcg/actuation inhaler 2 puffs, inhalation, Every 6 hours PRN    carbidopa-levodopa (Sinemet)  mg tablet 2.5 tablets, oral, 4 times daily, At 0400, 0700, 1000, 1300    chlorhexidine (Peridex) 0.12 % solution 15 mL, Mouth/Throat, 2 times daily    cholecalciferol (VITAMIN D3) 2,000 Units, oral, Daily    cimetidine (TAGAMET) 200 mg, oral, Daily    docusate sodium (Colace) 100 mg capsule 1 capsule, oral, 2 times daily    famotidine (PEPCID) 20 mg, oral, 2 times daily    FLUoxetine (PROZAC) 20 mg, Daily    furosemide (Lasix) 20 mg tablet 1 tablet, oral, Daily    medroxyPROGESTERone (PROVERA) 5 mg, 2 times daily     melatonin 3 mg capsule 1 capsule, Nightly    metoprolol succinate XL (TOPROL-XL) 25 mg, oral, Daily    pimavanserin (Nuplazid) 34 mg capsule 1 capsule, oral, Daily    potassium chloride CR 20 mEq ER tablet 30 mEq, oral, Daily, Do not crush or chew.    QUEtiapine (SEROQUEL) 100 mg, oral, 2 times daily    rasagiline (Azilect) 1 mg tablet 1 tablet, oral, Daily    rivastigmine (EXELON) 3 mg, oral, 2 times daily     Physical Exam:  General: Alert and oriented x1-2, well nourished, no acute distress.  Eye: EOMI, normal conjunctiva.  HENT: Normocephalic, clear tympanic membranes  Neck: Supple, non-tender, no carotid bruits  Lungs: Clear to auscultation, non-labored respiration.  Heart: Normal rate, regular rhythm  Abdomen: Soft, non-tender, non-distended  Musculoskeletal: Normal range of motion - some weakness  Skin: Skin is warm, dry and pink, no rashes or lesions.  Neurologic: intact senses  Psychiatric: Cooperative    Results/Data:   Lab Results   Component Value Date    WBC 7.3 08/03/2022    HGB 13.3 (L) 08/03/2022    HCT 40.9 (L) 08/03/2022     08/03/2022    CHOL 108 05/04/2022    TRIG 96 04/06/2022    HDL 42.9 05/04/2022    ALT <3 (L) 05/11/2021    AST 11 05/11/2021     08/03/2022    K 3.7 08/03/2022     08/03/2022    CREATININE 0.81 08/03/2022    BUN 21 08/03/2022    CO2 29 08/03/2022    HGBA1C 5.9 (A) 03/04/2022     Provider Impression:   Sexual Dysfunction, Dementia with Behavioral Disturbance, Anxiety, Major Depression, Schizophrenia  - continue monitoring in secure all male unit  - consulted psych services  - continue with current psych medications, any adjustment will come from psych    CV- HTN, AFib, h/o SSS, HLD, PVD  - continue with metoprolol for rate and BP control  - continue with lasix 20mg and kcL  - encourage elevation of BLE  - wrap with ACE wraps while OOB, remove qHS and PRN    Parkinson's Disease  - continue with current medications  - tremor on exam   - FU with Neuro as  advised q6m    Deconditioning, IFM, Weakness, Dysphagia  - PT OT ST to eval and tx  - now LTC with restorative care    THEA  - continue with HS Cpap  - c/w prn inhalers    Gastric Protection  - continue with pepcid     Code Status  - Full Code    ----------------  Written by Evita Saenz RN, acting as a scribe for Dr. Johnson. This note accurately reflects the work and decisions made by Dr. Johnson.     I, Dr. Johnson, attest all medical record entries made by the scribe were under my direction and were personally dictated by me. I have reviewed the chart and agree that the record accurately reflects my performance of the history, physical exam, and assessment and plan.

## 2025-03-16 ENCOUNTER — NURSING HOME VISIT (OUTPATIENT)
Dept: POST ACUTE CARE | Facility: EXTERNAL LOCATION | Age: 75
End: 2025-03-16
Payer: COMMERCIAL

## 2025-03-16 DIAGNOSIS — F02.C3 SEVERE DEMENTIA DUE TO PARKINSON'S DISEASE, WITH MOOD DISTURBANCE (MULTI): ICD-10-CM

## 2025-03-16 DIAGNOSIS — F52.8 HYPERSEXUALITY: ICD-10-CM

## 2025-03-16 DIAGNOSIS — G20.A1 SEVERE DEMENTIA DUE TO PARKINSON'S DISEASE, WITH MOOD DISTURBANCE (MULTI): ICD-10-CM

## 2025-03-16 DIAGNOSIS — G20.A1 PARKINSON'S DISEASE, UNSPECIFIED WHETHER DYSKINESIA PRESENT, UNSPECIFIED WHETHER MANIFESTATIONS FLUCTUATE: ICD-10-CM

## 2025-03-16 DIAGNOSIS — F41.8 DEPRESSION WITH ANXIETY: ICD-10-CM

## 2025-03-16 DIAGNOSIS — F20.9 SCHIZOPHRENIA, UNSPECIFIED TYPE: ICD-10-CM

## 2025-03-16 DIAGNOSIS — G47.33 OSA ON CPAP: ICD-10-CM

## 2025-03-16 DIAGNOSIS — Z00.00 ROUTINE GENERAL MEDICAL EXAMINATION AT A HEALTH CARE FACILITY: ICD-10-CM

## 2025-03-16 DIAGNOSIS — I48.91 ATRIAL FIBRILLATION, UNSPECIFIED TYPE (MULTI): ICD-10-CM

## 2025-03-16 DIAGNOSIS — Z78.9 NURSING HOME RESIDENT: ICD-10-CM

## 2025-03-16 PROCEDURE — 99308 SBSQ NF CARE LOW MDM 20: CPT | Performed by: INTERNAL MEDICINE

## 2025-03-16 NOTE — LETTER
Patient: Jian Odell  : 1950    Encounter Date: 2025    Name: Jian Odell  : 1950  MRN: [id number]  Visit Date: 3/16/2025  Chief Complaint: Monthly LTC Physician Visit for management of chronic disease    HPI: 75 y/o L[employer] male with PMH remarkable for Parkinson's disease, Dementia with behavioral disturbances, Schizophrenia, A-Fib, Sexual Dysfunction, HTN, HLD, RLS, PVD, THEA, asthma, Polyneuropathy, Depression, Anxiety and SSS whom was a LTC patient at [facility] where he was having increased sexual behaviors. Pt was brought to  d/t needing to be on an all male unit. Pt was brought to  for med mgt, psych services.    Subjective: Seen and examined today. Sitting up in wheelchair on exam in the hallway. Reports no issues. He is able to self propel his wheelchair short distances. Nursing reports no new concerns.     I have reviewed nursing notes since my last visit and document any significant changes Reviewed orders, medications, Labs. Reviewed and updated Interval hx and meds reviewed. Reviewed chart looking at current medications, treatments, labs, x-rays etc.     ROS:  As above in subjective. Otherwise, all other systems have been reviewed and are negative for complaint.    Current Outpatient Medications   Medication Instructions   • acetaminophen (TylenoL) 325 mg tablet 2 tablets, oral, Every 4 hours PRN   • albuterol (Ventolin HFA) 90 mcg/actuation inhaler 2 puffs, inhalation, Every 6 hours PRN   • carbidopa-levodopa (Sinemet)  mg tablet 2.5 tablets, oral, 4 times daily, At 0400, 0700, 1000, 1300   • chlorhexidine (Peridex) 0.12 % solution 15 mL, Mouth/Throat, 2 times daily   • cholecalciferol (VITAMIN D3) 2,000 Units, oral, Daily   • cimetidine (TAGAMET) 200 mg, oral, Daily   • docusate sodium (Colace) 100 mg capsule 1 capsule, oral, 2 times daily   • famotidine (PEPCID) 20 mg, oral, 2 times daily   • FLUoxetine (PROZAC) 20 mg, Daily   • furosemide (Lasix) 20 mg  tablet 1 tablet, oral, Daily   • medroxyPROGESTERone (PROVERA) 5 mg, 2 times daily   • melatonin 3 mg capsule 1 capsule, Nightly   • metoprolol succinate XL (TOPROL-XL) 25 mg, oral, Daily   • pimavanserin (Nuplazid) 34 mg capsule 1 capsule, oral, Daily   • potassium chloride CR 20 mEq ER tablet 30 mEq, oral, Daily, Do not crush or chew.   • QUEtiapine (SEROQUEL) 100 mg, oral, 2 times daily   • rasagiline (Azilect) 1 mg tablet 1 tablet, oral, Daily   • rivastigmine (EXELON) 3 mg, oral, 2 times daily     Physical Exam:  General: Alert and oriented x1-2, well nourished, no acute distress.  Eye: EOMI, normal conjunctiva.  HENT: Normocephalic, clear tympanic membranes  Neck: Supple, non-tender, no carotid bruits  Lungs: Clear to auscultation, non-labored respiration.  Heart: Normal rate, regular rhythm  Abdomen: Soft, non-tender, non-distended  Musculoskeletal: Normal range of motion - some weakness  Skin: Skin is warm, dry and pink, no rashes or lesions.  Neurologic: intact senses  Psychiatric: Cooperative    Results/Data:   Lab Results   Component Value Date    WBC 7.3 08/03/2022    HGB 13.3 (L) 08/03/2022    HCT 40.9 (L) 08/03/2022     08/03/2022    CHOL 108 05/04/2022    TRIG 96 04/06/2022    HDL 42.9 05/04/2022    ALT <3 (L) 05/11/2021    AST 11 05/11/2021     08/03/2022    K 3.7 08/03/2022     08/03/2022    CREATININE 0.81 08/03/2022    BUN 21 08/03/2022    CO2 29 08/03/2022    HGBA1C 5.9 (A) 03/04/2022     Provider Impression:   Sexual Dysfunction, Dementia with Behavioral Disturbance, Anxiety, Major Depression, Schizophrenia  - continue monitoring in secure all male unit  - consulted psych services  - continue with current psych medications, any adjustment will come from psych    CV- HTN, AFib, h/o SSS, HLD, PVD  - continue with metoprolol for rate and BP control  - continue with lasix 20mg and kcL  - encourage elevation of BLE  - wrap with ACE wraps while OOB, remove qHS and PRN    Parkinson's  Disease  - continue with current medications  - tremor on exam   - FU with Neuro as advised q6m    Deconditioning, IFM, Weakness, Dysphagia  - PT OT ST to eval and tx  - now LTC with restorative care    THEA  - continue with HS Cpap  - c/w prn inhalers    Gastric Protection  - continue with pepcid     Code Status  - Full Code    ----------------  Written by Evita Saenz RN, acting as a scribe for Dr. Johnson. This note accurately reflects the work and decisions made by Dr. Johnson.     I, Dr. Johnson, attest all medical record entries made by the scribe were under my direction and were personally dictated by me. I have reviewed the chart and agree that the record accurately reflects my performance of the history, physical exam, and assessment and plan.     Electronically Signed By: Johanny Pierre MD   5/19/25  1:52 PM

## 2025-05-19 NOTE — PROGRESS NOTES
Name: Jian Odell  : 1950  MRN: [id number]  Visit Date: 3/16/2025  Chief Complaint: Monthly LTC Physician Visit for management of chronic disease    HPI: 75 y/o [place] male with PMH remarkable for Parkinson's disease, Dementia with behavioral disturbances, Schizophrenia, A-Fib, Sexual Dysfunction, HTN, HLD, RLS, PVD, THEA, asthma, Polyneuropathy, Depression, Anxiety and SSS whom was a LTC patient at [facility] where he was having increased sexual behaviors. Pt was brought to  d/t needing to be on an all male unit. Pt was brought to  for med mgt, psych services.    Subjective: Seen and examined today. Sitting up in wheelchair on exam in the hallway. Reports no issues. He is able to self propel his wheelchair short distances. Nursing reports no new concerns.     I have reviewed nursing notes since my last visit and document any significant changes Reviewed orders, medications, Labs. Reviewed and updated Interval hx and meds reviewed. Reviewed chart looking at current medications, treatments, labs, x-rays etc.     ROS:  As above in subjective. Otherwise, all other systems have been reviewed and are negative for complaint.    Current Outpatient Medications   Medication Instructions    acetaminophen (TylenoL) 325 mg tablet 2 tablets, oral, Every 4 hours PRN    albuterol (Ventolin HFA) 90 mcg/actuation inhaler 2 puffs, inhalation, Every 6 hours PRN    carbidopa-levodopa (Sinemet)  mg tablet 2.5 tablets, oral, 4 times daily, At 0400, 0700, 1000, 1300    chlorhexidine (Peridex) 0.12 % solution 15 mL, Mouth/Throat, 2 times daily    cholecalciferol (VITAMIN D3) 2,000 Units, oral, Daily    cimetidine (TAGAMET) 200 mg, oral, Daily    docusate sodium (Colace) 100 mg capsule 1 capsule, oral, 2 times daily    famotidine (PEPCID) 20 mg, oral, 2 times daily    FLUoxetine (PROZAC) 20 mg, Daily    furosemide (Lasix) 20 mg tablet 1 tablet, oral, Daily    medroxyPROGESTERone (PROVERA) 5 mg, 2 times daily     melatonin 3 mg capsule 1 capsule, Nightly    metoprolol succinate XL (TOPROL-XL) 25 mg, oral, Daily    pimavanserin (Nuplazid) 34 mg capsule 1 capsule, oral, Daily    potassium chloride CR 20 mEq ER tablet 30 mEq, oral, Daily, Do not crush or chew.    QUEtiapine (SEROQUEL) 100 mg, oral, 2 times daily    rasagiline (Azilect) 1 mg tablet 1 tablet, oral, Daily    rivastigmine (EXELON) 3 mg, oral, 2 times daily     Physical Exam:  General: Alert and oriented x1-2, well nourished, no acute distress.  Eye: EOMI, normal conjunctiva.  HENT: Normocephalic, clear tympanic membranes  Neck: Supple, non-tender, no carotid bruits  Lungs: Clear to auscultation, non-labored respiration.  Heart: Normal rate, regular rhythm  Abdomen: Soft, non-tender, non-distended  Musculoskeletal: Normal range of motion - some weakness  Skin: Skin is warm, dry and pink, no rashes or lesions.  Neurologic: intact senses  Psychiatric: Cooperative    Results/Data:   Lab Results   Component Value Date    WBC 7.3 08/03/2022    HGB 13.3 (L) 08/03/2022    HCT 40.9 (L) 08/03/2022     08/03/2022    CHOL 108 05/04/2022    TRIG 96 04/06/2022    HDL 42.9 05/04/2022    ALT <3 (L) 05/11/2021    AST 11 05/11/2021     08/03/2022    K 3.7 08/03/2022     08/03/2022    CREATININE 0.81 08/03/2022    BUN 21 08/03/2022    CO2 29 08/03/2022    HGBA1C 5.9 (A) 03/04/2022     Provider Impression:   Sexual Dysfunction, Dementia with Behavioral Disturbance, Anxiety, Major Depression, Schizophrenia  - continue monitoring in secure all male unit  - consulted psych services  - continue with current psych medications, any adjustment will come from psych    CV- HTN, AFib, h/o SSS, HLD, PVD  - continue with metoprolol for rate and BP control  - continue with lasix 20mg and kcL  - encourage elevation of BLE  - wrap with ACE wraps while OOB, remove qHS and PRN    Parkinson's Disease  - continue with current medications  - tremor on exam   - FU with Neuro as  advised q6m    Deconditioning, IFM, Weakness, Dysphagia  - PT OT ST to eval and tx  - now LTC with restorative care    THEA  - continue with HS Cpap  - c/w prn inhalers    Gastric Protection  - continue with pepcid     Code Status  - Full Code    ----------------  Written by Evita Saenz RN, acting as a scribe for Dr. Johnson. This note accurately reflects the work and decisions made by Dr. Johnson.     I, Dr. Johnson, attest all medical record entries made by the scribe were under my direction and were personally dictated by me. I have reviewed the chart and agree that the record accurately reflects my performance of the history, physical exam, and assessment and plan.

## 2025-08-11 ENCOUNTER — HOSPITAL ENCOUNTER (OUTPATIENT)
Dept: RADIOLOGY | Facility: HOSPITAL | Age: 75
Discharge: HOME | End: 2025-08-11
Payer: MEDICARE

## 2025-08-11 DIAGNOSIS — R13.14 PHARYNGOESOPHAGEAL DYSPHAGIA: Primary | ICD-10-CM

## 2025-08-11 DIAGNOSIS — R13.12 DYSPHAGIA, OROPHARYNGEAL PHASE: ICD-10-CM

## 2025-08-11 PROCEDURE — 74230 X-RAY XM SWLNG FUNCJ C+: CPT | Performed by: STUDENT IN AN ORGANIZED HEALTH CARE EDUCATION/TRAINING PROGRAM

## 2025-08-11 PROCEDURE — 2500000005 HC RX 250 GENERAL PHARMACY W/O HCPCS: Performed by: INTERNAL MEDICINE

## 2025-08-11 PROCEDURE — 92611 MOTION FLUOROSCOPY/SWALLOW: CPT | Mod: GN | Performed by: PHARMACIST

## 2025-08-11 PROCEDURE — 74230 X-RAY XM SWLNG FUNCJ C+: CPT

## 2025-08-11 RX ADMIN — BARIUM SULFATE 145 ML: 400 PASTE ORAL at 14:20

## 2025-08-11 RX ADMIN — BARIUM SULFATE 5 ML: 400 SUSPENSION ORAL at 14:20

## 2025-08-11 RX ADMIN — BARIUM SULFATE 700 MG: 700 TABLET ORAL at 14:21

## 2025-08-11 RX ADMIN — BARIUM SULFATE 110 ML: 0.81 POWDER, FOR SUSPENSION ORAL at 14:20

## 2025-08-11 RX ADMIN — BARIUM SULFATE 70 ML: 400 SUSPENSION ORAL at 14:20
